# Patient Record
Sex: MALE | Race: WHITE | NOT HISPANIC OR LATINO | Employment: OTHER | URBAN - METROPOLITAN AREA
[De-identification: names, ages, dates, MRNs, and addresses within clinical notes are randomized per-mention and may not be internally consistent; named-entity substitution may affect disease eponyms.]

---

## 2022-10-30 ENCOUNTER — HOSPITAL ENCOUNTER (INPATIENT)
Facility: HOSPITAL | Age: 72
LOS: 4 days | Discharge: NURSING, REHAB OR LONG-TERM CARE | DRG: 208 | End: 2022-11-03
Attending: EMERGENCY MEDICINE | Admitting: STUDENT IN AN ORGANIZED HEALTH CARE EDUCATION/TRAINING PROGRAM
Payer: MEDICARE

## 2022-10-30 DIAGNOSIS — Z99.11 ON MECHANICALLY ASSISTED VENTILATION: ICD-10-CM

## 2022-10-30 DIAGNOSIS — U07.1 COVID: Primary | ICD-10-CM

## 2022-10-30 DIAGNOSIS — J96.01 ACUTE RESPIRATORY FAILURE WITH HYPOXIA: ICD-10-CM

## 2022-10-30 DIAGNOSIS — Z97.8 ENDOTRACHEALLY INTUBATED: ICD-10-CM

## 2022-10-30 DIAGNOSIS — J18.9 PNEUMONIA OF RIGHT MIDDLE LOBE DUE TO INFECTIOUS ORGANISM: ICD-10-CM

## 2022-10-30 DIAGNOSIS — J98.11 ATELECTASIS: ICD-10-CM

## 2022-10-30 DIAGNOSIS — R41.82 AMS (ALTERED MENTAL STATUS): ICD-10-CM

## 2022-10-30 DIAGNOSIS — G93.1 ACUTE ANOXIC ENCEPHALOPATHY: ICD-10-CM

## 2022-10-30 DIAGNOSIS — I82.443 ACUTE DEEP VEIN THROMBOSIS (DVT) OF TIBIAL VEIN OF BOTH LOWER EXTREMITIES: ICD-10-CM

## 2022-10-30 DIAGNOSIS — J96.01 ACUTE HYPOXEMIC RESPIRATORY FAILURE: ICD-10-CM

## 2022-10-30 PROBLEM — I10 HYPERTENSION: Status: ACTIVE | Noted: 2022-10-30

## 2022-10-30 PROBLEM — R76.8 COVID-19 VIRUS ANTIBODY DETECTED: Status: ACTIVE | Noted: 2022-10-30

## 2022-10-30 PROBLEM — E53.8 B12 DEFICIENCY: Status: ACTIVE | Noted: 2022-05-04

## 2022-10-30 PROBLEM — M79.605 BILATERAL LEG PAIN: Status: ACTIVE | Noted: 2022-07-28

## 2022-10-30 PROBLEM — G47.00 INSOMNIA: Status: ACTIVE | Noted: 2022-07-18

## 2022-10-30 PROBLEM — G06.1 ABSCESS IN EPIDURAL SPACE OF LUMBAR SPINE: Status: ACTIVE | Noted: 2022-05-04

## 2022-10-30 PROBLEM — M79.604 BILATERAL LEG PAIN: Status: ACTIVE | Noted: 2022-07-28

## 2022-10-30 PROBLEM — M54.50 ACUTE MIDLINE LOW BACK PAIN WITHOUT SCIATICA: Status: ACTIVE | Noted: 2022-05-02

## 2022-10-30 PROBLEM — K21.9 GASTROESOPHAGEAL REFLUX DISEASE WITHOUT ESOPHAGITIS: Status: ACTIVE | Noted: 2020-08-12

## 2022-10-30 LAB
ALBUMIN SERPL BCP-MCNC: 3.1 G/DL (ref 3.5–5.2)
ALLENS TEST: ABNORMAL
ALP SERPL-CCNC: 74 U/L (ref 55–135)
ALT SERPL W/O P-5'-P-CCNC: 14 U/L (ref 10–44)
AMMONIA PLAS-SCNC: NORMAL UMOL/L
ANION GAP SERPL CALC-SCNC: 10 MMOL/L (ref 8–16)
AORTIC ROOT ANNULUS: 4.61 CM
ASCENDING AORTA: 4.04 CM
AST SERPL-CCNC: 16 U/L (ref 10–40)
AV INDEX (PROSTH): 0.6
AV MEAN GRADIENT: 4 MMHG
AV PEAK GRADIENT: 6 MMHG
AV VALVE AREA: 2.73 CM2
AV VELOCITY RATIO: 0.6
BACTERIA #/AREA URNS HPF: ABNORMAL /HPF
BASOPHILS # BLD AUTO: 0.03 K/UL (ref 0–0.2)
BASOPHILS NFR BLD: 0.3 % (ref 0–1.9)
BILIRUB SERPL-MCNC: 0.3 MG/DL (ref 0.1–1)
BILIRUB UR QL STRIP: NEGATIVE
BNP SERPL-MCNC: 34 PG/ML (ref 0–99)
BUN SERPL-MCNC: 16 MG/DL (ref 8–23)
CALCIUM SERPL-MCNC: 9 MG/DL (ref 8.7–10.5)
CHLORIDE SERPL-SCNC: 99 MMOL/L (ref 95–110)
CLARITY UR: CLEAR
CO2 SERPL-SCNC: 35 MMOL/L (ref 23–29)
COLOR UR: YELLOW
CREAT SERPL-MCNC: 0.7 MG/DL (ref 0.5–1.4)
CRP SERPL-MCNC: 21.1 MG/L (ref 0–8.2)
CV ECHO LV RWT: 0.97 CM
DELSYS: ABNORMAL
DIFFERENTIAL METHOD: ABNORMAL
DOP CALC AO PEAK VEL: 1.18 M/S
DOP CALC AO VTI: 22.7 CM
DOP CALC LVOT AREA: 4.5 CM2
DOP CALC LVOT DIAMETER: 2.4 CM
DOP CALC LVOT PEAK VEL: 0.71 M/S
DOP CALC LVOT STROKE VOLUME: 61.95 CM3
DOP CALC RVOT PEAK VEL: 0.73 M/S
DOP CALC RVOT VTI: 11.3 CM
DOP CALCLVOT PEAK VEL VTI: 13.7 CM
E WAVE DECELERATION TIME: 273.04 MSEC
E/A RATIO: 1.15
E/E' RATIO: 6.71 M/S
ECHO LV POSTERIOR WALL: 1.82 CM (ref 0.6–1.1)
EJECTION FRACTION: 50 %
EOSINOPHIL # BLD AUTO: 0.1 K/UL (ref 0–0.5)
EOSINOPHIL NFR BLD: 1.2 % (ref 0–8)
ERYTHROCYTE [DISTWIDTH] IN BLOOD BY AUTOMATED COUNT: 15.6 % (ref 11.5–14.5)
ERYTHROCYTE [SEDIMENTATION RATE] IN BLOOD BY WESTERGREN METHOD: 20 MM/H
ERYTHROCYTE [SEDIMENTATION RATE] IN BLOOD BY WESTERGREN METHOD: 27 MM/HR (ref 0–10)
ERYTHROCYTE [SEDIMENTATION RATE] IN BLOOD BY WESTERGREN METHOD: 30 MM/H
ERYTHROCYTE [SEDIMENTATION RATE] IN BLOOD BY WESTERGREN METHOD: 30 MM/H
EST. GFR  (NO RACE VARIABLE): >60 ML/MIN/1.73 M^2
FERRITIN SERPL-MCNC: 52 NG/ML (ref 20–300)
FIO2: 100
FRACTIONAL SHORTENING: 29 % (ref 28–44)
GLUCOSE SERPL-MCNC: 147 MG/DL (ref 70–110)
GLUCOSE UR QL STRIP: NEGATIVE
HCO3 UR-SCNC: 32.6 MMOL/L (ref 24–28)
HCO3 UR-SCNC: 35.4 MMOL/L (ref 24–28)
HCO3 UR-SCNC: 43.9 MMOL/L (ref 24–28)
HCT VFR BLD AUTO: 46.7 % (ref 40–54)
HGB BLD-MCNC: 13.9 G/DL (ref 14–18)
HGB UR QL STRIP: NEGATIVE
HYALINE CASTS #/AREA URNS LPF: 3 /LPF
IMM GRANULOCYTES # BLD AUTO: 0.08 K/UL (ref 0–0.04)
IMM GRANULOCYTES NFR BLD AUTO: 0.8 % (ref 0–0.5)
INFLUENZA A, MOLECULAR: NEGATIVE
INFLUENZA B, MOLECULAR: NEGATIVE
INTERVENTRICULAR SEPTUM: 2.1 CM (ref 0.6–1.1)
IVRT: 91.34 MSEC
KETONES UR QL STRIP: NEGATIVE
LA MAJOR: 4.3 CM
LA MINOR: 4.08 CM
LA WIDTH: 3.5 CM
LACTATE SERPL-SCNC: 0.7 MMOL/L (ref 0.5–2.2)
LEFT ATRIUM SIZE: 3.59 CM
LEFT ATRIUM VOLUME: 44.72 CM3
LEFT INTERNAL DIMENSION IN SYSTOLE: 2.68 CM (ref 2.1–4)
LEFT VENTRICLE DIASTOLIC VOLUME: 60.6 ML
LEFT VENTRICLE SYSTOLIC VOLUME: 26.5 ML
LEFT VENTRICULAR INTERNAL DIMENSION IN DIASTOLE: 3.77 CM (ref 3.5–6)
LEFT VENTRICULAR MASS: 334.38 G
LEUKOCYTE ESTERASE UR QL STRIP: NEGATIVE
LV LATERAL E/E' RATIO: 5.88 M/S
LV SEPTAL E/E' RATIO: 7.83 M/S
LVOT MG: 1.59 MMHG
LVOT MV: 0.61 CM/S
LYMPHOCYTES # BLD AUTO: 0.7 K/UL (ref 1–4.8)
LYMPHOCYTES NFR BLD: 7.6 % (ref 18–48)
MCH RBC QN AUTO: 30.7 PG (ref 27–31)
MCHC RBC AUTO-ENTMCNC: 29.8 G/DL (ref 32–36)
MCV RBC AUTO: 103 FL (ref 82–98)
MICROSCOPIC COMMENT: ABNORMAL
MODE: ABNORMAL
MONOCYTES # BLD AUTO: 0.8 K/UL (ref 0.3–1)
MONOCYTES NFR BLD: 8.6 % (ref 4–15)
MV PEAK A VEL: 0.41 M/S
MV PEAK E VEL: 0.47 M/S
MV STENOSIS PRESSURE HALF TIME: 79.18 MS
MV VALVE AREA P 1/2 METHOD: 2.78 CM2
NEUTROPHILS # BLD AUTO: 7.8 K/UL (ref 1.8–7.7)
NEUTROPHILS NFR BLD: 81.5 % (ref 38–73)
NITRITE UR QL STRIP: NEGATIVE
NRBC BLD-RTO: 0 /100 WBC
PCO2 BLDA: 44.3 MMHG (ref 35–45)
PCO2 BLDA: 56.1 MMHG (ref 35–45)
PCO2 BLDA: 98.5 MMHG (ref 35–45)
PEEP: 10
PEEP: 10
PEEP: 8
PH SMN: 7.26 [PH] (ref 7.35–7.45)
PH SMN: 7.41 [PH] (ref 7.35–7.45)
PH SMN: 7.47 [PH] (ref 7.35–7.45)
PH UR STRIP: 6 [PH] (ref 5–8)
PISA TR MAX VEL: 2.14 M/S
PLATELET # BLD AUTO: 206 K/UL (ref 150–450)
PMV BLD AUTO: 9.3 FL (ref 9.2–12.9)
PO2 BLDA: 54 MMHG (ref 80–100)
PO2 BLDA: 67 MMHG (ref 80–100)
PO2 BLDA: 71 MMHG (ref 80–100)
POC BE: 11 MMOL/L
POC BE: 17 MMOL/L
POC BE: 9 MMOL/L
POC SATURATED O2: 89 % (ref 95–100)
POC SATURATED O2: 90 % (ref 95–100)
POC SATURATED O2: 93 % (ref 95–100)
POCT GLUCOSE: 102 MG/DL (ref 70–110)
POCT GLUCOSE: 133 MG/DL (ref 70–110)
POTASSIUM SERPL-SCNC: 4.7 MMOL/L (ref 3.5–5.1)
PROCALCITONIN SERPL IA-MCNC: 0.02 NG/ML
PROCALCITONIN SERPL IA-MCNC: 0.03 NG/ML
PROT SERPL-MCNC: 7.6 G/DL (ref 6–8.4)
PROT UR QL STRIP: ABNORMAL
PV MEAN GRADIENT: 0.99 MMHG
RA MAJOR: 4.43 CM
RA WIDTH: 2.9 CM
RBC # BLD AUTO: 4.53 M/UL (ref 4.6–6.2)
RBC #/AREA URNS HPF: 0 /HPF (ref 0–4)
RIGHT VENTRICULAR END-DIASTOLIC DIMENSION: 4.45 CM
SAMPLE: ABNORMAL
SARS-COV-2 RDRP RESP QL NAA+PROBE: POSITIVE
SINUS: 4.75 CM
SITE: ABNORMAL
SODIUM SERPL-SCNC: 144 MMOL/L (ref 136–145)
SP GR UR STRIP: 1.02 (ref 1–1.03)
SPECIMEN SOURCE: NORMAL
STJ: 4.2 CM
T4 FREE SERPL-MCNC: 0.89 NG/DL (ref 0.71–1.51)
TDI LATERAL: 0.08 M/S
TDI SEPTAL: 0.06 M/S
TDI: 0.07 M/S
TR MAX PG: 18 MMHG
TROPONIN I SERPL DL<=0.01 NG/ML-MCNC: <0.006 NG/ML (ref 0–0.03)
TSH SERPL DL<=0.005 MIU/L-ACNC: 0.23 UIU/ML (ref 0.4–4)
URN SPEC COLLECT METH UR: ABNORMAL
UROBILINOGEN UR STRIP-ACNC: ABNORMAL EU/DL
VT: 450
VT: 470
VT: 470
WBC # BLD AUTO: 9.53 K/UL (ref 3.9–12.7)
WBC #/AREA URNS HPF: 1 /HPF (ref 0–5)

## 2022-10-30 PROCEDURE — 87040 BLOOD CULTURE FOR BACTERIA: CPT | Mod: 59 | Performed by: EMERGENCY MEDICINE

## 2022-10-30 PROCEDURE — 87502 INFLUENZA DNA AMP PROBE: CPT

## 2022-10-30 PROCEDURE — 36600 WITHDRAWAL OF ARTERIAL BLOOD: CPT

## 2022-10-30 PROCEDURE — 82803 BLOOD GASES ANY COMBINATION: CPT

## 2022-10-30 PROCEDURE — 25000242 PHARM REV CODE 250 ALT 637 W/ HCPCS: Performed by: STUDENT IN AN ORGANIZED HEALTH CARE EDUCATION/TRAINING PROGRAM

## 2022-10-30 PROCEDURE — 99900026 HC AIRWAY MAINTENANCE (STAT)

## 2022-10-30 PROCEDURE — 99291 PR CRITICAL CARE, E/M 30-74 MINUTES: ICD-10-PCS | Mod: ,,, | Performed by: INTERNAL MEDICINE

## 2022-10-30 PROCEDURE — 63600175 PHARM REV CODE 636 W HCPCS

## 2022-10-30 PROCEDURE — 63600175 PHARM REV CODE 636 W HCPCS: Performed by: STUDENT IN AN ORGANIZED HEALTH CARE EDUCATION/TRAINING PROGRAM

## 2022-10-30 PROCEDURE — 99900035 HC TECH TIME PER 15 MIN (STAT)

## 2022-10-30 PROCEDURE — 80053 COMPREHEN METABOLIC PANEL: CPT | Performed by: EMERGENCY MEDICINE

## 2022-10-30 PROCEDURE — 86140 C-REACTIVE PROTEIN: CPT | Performed by: INTERNAL MEDICINE

## 2022-10-30 PROCEDURE — 27200966 HC CLOSED SUCTION SYSTEM

## 2022-10-30 PROCEDURE — 25000003 PHARM REV CODE 250: Performed by: STUDENT IN AN ORGANIZED HEALTH CARE EDUCATION/TRAINING PROGRAM

## 2022-10-30 PROCEDURE — 84145 PROCALCITONIN (PCT): CPT | Mod: 91 | Performed by: INTERNAL MEDICINE

## 2022-10-30 PROCEDURE — 94761 N-INVAS EAR/PLS OXIMETRY MLT: CPT

## 2022-10-30 PROCEDURE — 84443 ASSAY THYROID STIM HORMONE: CPT | Performed by: INTERNAL MEDICINE

## 2022-10-30 PROCEDURE — 87798 DETECT AGENT NOS DNA AMP: CPT | Mod: 59 | Performed by: INTERNAL MEDICINE

## 2022-10-30 PROCEDURE — 87205 SMEAR GRAM STAIN: CPT | Performed by: INTERNAL MEDICINE

## 2022-10-30 PROCEDURE — 93010 EKG 12-LEAD: ICD-10-PCS | Mod: ,,, | Performed by: INTERNAL MEDICINE

## 2022-10-30 PROCEDURE — 36556 INSERT NON-TUNNEL CV CATH: CPT

## 2022-10-30 PROCEDURE — 36415 COLL VENOUS BLD VENIPUNCTURE: CPT | Performed by: INTERNAL MEDICINE

## 2022-10-30 PROCEDURE — 99291 CRITICAL CARE FIRST HOUR: CPT | Mod: ,,, | Performed by: INTERNAL MEDICINE

## 2022-10-30 PROCEDURE — 25500020 PHARM REV CODE 255: Performed by: STUDENT IN AN ORGANIZED HEALTH CARE EDUCATION/TRAINING PROGRAM

## 2022-10-30 PROCEDURE — 25000003 PHARM REV CODE 250: Performed by: EMERGENCY MEDICINE

## 2022-10-30 PROCEDURE — 85025 COMPLETE CBC W/AUTO DIFF WBC: CPT | Performed by: EMERGENCY MEDICINE

## 2022-10-30 PROCEDURE — 63600175 PHARM REV CODE 636 W HCPCS: Performed by: INTERNAL MEDICINE

## 2022-10-30 PROCEDURE — 82728 ASSAY OF FERRITIN: CPT | Performed by: INTERNAL MEDICINE

## 2022-10-30 PROCEDURE — 94667 MNPJ CHEST WALL 1ST: CPT

## 2022-10-30 PROCEDURE — 27000221 HC OXYGEN, UP TO 24 HOURS

## 2022-10-30 PROCEDURE — 25000003 PHARM REV CODE 250: Performed by: INTERNAL MEDICINE

## 2022-10-30 PROCEDURE — 82140 ASSAY OF AMMONIA: CPT | Performed by: INTERNAL MEDICINE

## 2022-10-30 PROCEDURE — 27000207 HC ISOLATION

## 2022-10-30 PROCEDURE — 94668 MNPJ CHEST WALL SBSQ: CPT

## 2022-10-30 PROCEDURE — 84145 PROCALCITONIN (PCT): CPT | Performed by: EMERGENCY MEDICINE

## 2022-10-30 PROCEDURE — 36620 INSERTION CATHETER ARTERY: CPT

## 2022-10-30 PROCEDURE — 31500 INSERT EMERGENCY AIRWAY: CPT

## 2022-10-30 PROCEDURE — 81000 URINALYSIS NONAUTO W/SCOPE: CPT | Performed by: EMERGENCY MEDICINE

## 2022-10-30 PROCEDURE — 63600175 PHARM REV CODE 636 W HCPCS: Mod: TB | Performed by: INTERNAL MEDICINE

## 2022-10-30 PROCEDURE — 63600175 PHARM REV CODE 636 W HCPCS: Performed by: EMERGENCY MEDICINE

## 2022-10-30 PROCEDURE — 25000242 PHARM REV CODE 250 ALT 637 W/ HCPCS: Performed by: INTERNAL MEDICINE

## 2022-10-30 PROCEDURE — 83880 ASSAY OF NATRIURETIC PEPTIDE: CPT | Performed by: EMERGENCY MEDICINE

## 2022-10-30 PROCEDURE — 20000000 HC ICU ROOM

## 2022-10-30 PROCEDURE — 84484 ASSAY OF TROPONIN QUANT: CPT | Performed by: EMERGENCY MEDICINE

## 2022-10-30 PROCEDURE — U0002 COVID-19 LAB TEST NON-CDC: HCPCS | Performed by: EMERGENCY MEDICINE

## 2022-10-30 PROCEDURE — 87502 INFLUENZA DNA AMP PROBE: CPT | Performed by: EMERGENCY MEDICINE

## 2022-10-30 PROCEDURE — 93005 ELECTROCARDIOGRAM TRACING: CPT

## 2022-10-30 PROCEDURE — 85651 RBC SED RATE NONAUTOMATED: CPT | Performed by: INTERNAL MEDICINE

## 2022-10-30 PROCEDURE — 93010 ELECTROCARDIOGRAM REPORT: CPT | Mod: ,,, | Performed by: INTERNAL MEDICINE

## 2022-10-30 PROCEDURE — 83605 ASSAY OF LACTIC ACID: CPT | Performed by: EMERGENCY MEDICINE

## 2022-10-30 PROCEDURE — 94002 VENT MGMT INPAT INIT DAY: CPT

## 2022-10-30 PROCEDURE — 82533 TOTAL CORTISOL: CPT | Performed by: INTERNAL MEDICINE

## 2022-10-30 PROCEDURE — 99291 CRITICAL CARE FIRST HOUR: CPT | Mod: 25

## 2022-10-30 PROCEDURE — 94640 AIRWAY INHALATION TREATMENT: CPT

## 2022-10-30 PROCEDURE — 84439 ASSAY OF FREE THYROXINE: CPT | Performed by: INTERNAL MEDICINE

## 2022-10-30 PROCEDURE — 87070 CULTURE OTHR SPECIMN AEROBIC: CPT | Performed by: INTERNAL MEDICINE

## 2022-10-30 RX ORDER — GABAPENTIN 400 MG/1
400 CAPSULE ORAL 3 TIMES DAILY
COMMUNITY

## 2022-10-30 RX ORDER — DOCUSATE SODIUM 50 MG/5ML
100 LIQUID ORAL DAILY
Status: DISCONTINUED | OUTPATIENT
Start: 2022-10-30 | End: 2022-10-31

## 2022-10-30 RX ORDER — ASPIRIN 81 MG/1
81 TABLET ORAL DAILY
COMMUNITY

## 2022-10-30 RX ORDER — FENTANYL CITRATE-0.9 % NACL/PF 10 MCG/ML
0-200 PLASTIC BAG, INJECTION (ML) INTRAVENOUS CONTINUOUS
Status: DISCONTINUED | OUTPATIENT
Start: 2022-10-30 | End: 2022-11-02

## 2022-10-30 RX ORDER — ROCURONIUM BROMIDE 10 MG/ML
100 INJECTION, SOLUTION INTRAVENOUS ONCE
Status: COMPLETED | OUTPATIENT
Start: 2022-10-30 | End: 2022-10-30

## 2022-10-30 RX ORDER — CHLORHEXIDINE GLUCONATE ORAL RINSE 1.2 MG/ML
15 SOLUTION DENTAL 2 TIMES DAILY
Status: DISCONTINUED | OUTPATIENT
Start: 2022-10-30 | End: 2022-11-02

## 2022-10-30 RX ORDER — AMOXICILLIN 250 MG
1 CAPSULE ORAL NIGHTLY
Status: ON HOLD | COMMUNITY
Start: 2022-05-23 | End: 2022-11-03 | Stop reason: HOSPADM

## 2022-10-30 RX ORDER — METOPROLOL SUCCINATE 50 MG/1
50 TABLET, EXTENDED RELEASE ORAL DAILY
COMMUNITY

## 2022-10-30 RX ORDER — ETOMIDATE 2 MG/ML
20 INJECTION INTRAVENOUS
Status: COMPLETED | OUTPATIENT
Start: 2022-10-30 | End: 2022-10-30

## 2022-10-30 RX ORDER — ETOMIDATE 2 MG/ML
INJECTION INTRAVENOUS
Status: DISPENSED
Start: 2022-10-30 | End: 2022-10-30

## 2022-10-30 RX ORDER — MORPHINE SULFATE 4 MG/ML
4 INJECTION, SOLUTION INTRAMUSCULAR; INTRAVENOUS EVERY 4 HOURS PRN
Status: DISCONTINUED | OUTPATIENT
Start: 2022-10-30 | End: 2022-11-03 | Stop reason: HOSPADM

## 2022-10-30 RX ORDER — ALBUTEROL SULFATE 0.83 MG/ML
2.5 SOLUTION RESPIRATORY (INHALATION) EVERY 8 HOURS
Status: DISCONTINUED | OUTPATIENT
Start: 2022-10-30 | End: 2022-11-01

## 2022-10-30 RX ORDER — ALBUTEROL SULFATE 0.83 MG/ML
2.5 SOLUTION RESPIRATORY (INHALATION) EVERY 4 HOURS PRN
Status: DISCONTINUED | OUTPATIENT
Start: 2022-10-30 | End: 2022-10-30

## 2022-10-30 RX ORDER — ACETAMINOPHEN, DIPHENHYDRAMINE HCL, PHENYLEPHRINE HCL 325; 25; 5 MG/1; MG/1; MG/1
10 TABLET ORAL NIGHTLY
COMMUNITY

## 2022-10-30 RX ORDER — ACETAMINOPHEN 325 MG/1
650 TABLET ORAL EVERY 4 HOURS PRN
Status: DISCONTINUED | OUTPATIENT
Start: 2022-10-30 | End: 2022-10-31 | Stop reason: SDUPTHER

## 2022-10-30 RX ORDER — FOLIC ACID 1 MG/1
1 TABLET ORAL DAILY
COMMUNITY
Start: 2022-05-24 | End: 2023-05-24

## 2022-10-30 RX ORDER — PROPOFOL 10 MG/ML
0-50 INJECTION, EMULSION INTRAVENOUS CONTINUOUS
Status: DISCONTINUED | OUTPATIENT
Start: 2022-10-30 | End: 2022-11-02

## 2022-10-30 RX ORDER — PROPOFOL 10 MG/ML
0-50 INJECTION, EMULSION INTRAVENOUS CONTINUOUS
Status: DISCONTINUED | OUTPATIENT
Start: 2022-10-30 | End: 2022-10-30

## 2022-10-30 RX ORDER — NOREPINEPHRINE BITARTRATE/D5W 4MG/250ML
0-3 PLASTIC BAG, INJECTION (ML) INTRAVENOUS CONTINUOUS
Status: DISCONTINUED | OUTPATIENT
Start: 2022-10-30 | End: 2022-11-02

## 2022-10-30 RX ORDER — MUPIROCIN 20 MG/G
OINTMENT TOPICAL 2 TIMES DAILY
Status: DISCONTINUED | OUTPATIENT
Start: 2022-10-30 | End: 2022-11-03 | Stop reason: HOSPADM

## 2022-10-30 RX ORDER — FAMOTIDINE 10 MG/ML
20 INJECTION INTRAVENOUS EVERY 12 HOURS
Status: DISCONTINUED | OUTPATIENT
Start: 2022-10-30 | End: 2022-10-31

## 2022-10-30 RX ORDER — ZINC SULFATE 50(220)MG
220 CAPSULE ORAL DAILY
Status: DISCONTINUED | OUTPATIENT
Start: 2022-10-30 | End: 2022-10-31

## 2022-10-30 RX ORDER — MAGNESIUM SULFATE HEPTAHYDRATE 40 MG/ML
4 INJECTION, SOLUTION INTRAVENOUS
Status: DISCONTINUED | OUTPATIENT
Start: 2022-10-30 | End: 2022-11-03 | Stop reason: HOSPADM

## 2022-10-30 RX ORDER — LOSARTAN POTASSIUM 50 MG/1
1 TABLET ORAL DAILY
COMMUNITY
Start: 2022-05-24 | End: 2023-05-24

## 2022-10-30 RX ORDER — ONDANSETRON 4 MG/1
4 TABLET, FILM COATED ORAL EVERY 4 HOURS PRN
COMMUNITY

## 2022-10-30 RX ORDER — PROPOFOL 10 MG/ML
INJECTION, EMULSION INTRAVENOUS
Status: COMPLETED
Start: 2022-10-30 | End: 2022-10-30

## 2022-10-30 RX ORDER — SODIUM CHLORIDE 0.9 % (FLUSH) 0.9 %
10 SYRINGE (ML) INJECTION
Status: DISCONTINUED | OUTPATIENT
Start: 2022-10-30 | End: 2022-11-03 | Stop reason: HOSPADM

## 2022-10-30 RX ORDER — INSULIN ASPART 100 [IU]/ML
1-10 INJECTION, SOLUTION INTRAVENOUS; SUBCUTANEOUS EVERY 6 HOURS PRN
Status: DISCONTINUED | OUTPATIENT
Start: 2022-10-30 | End: 2022-11-03 | Stop reason: HOSPADM

## 2022-10-30 RX ORDER — ATORVASTATIN CALCIUM 10 MG/1
1 TABLET, FILM COATED ORAL DAILY
COMMUNITY
Start: 2022-01-13

## 2022-10-30 RX ORDER — DEXAMETHASONE SODIUM PHOSPHATE 4 MG/ML
6 INJECTION, SOLUTION INTRA-ARTICULAR; INTRALESIONAL; INTRAMUSCULAR; INTRAVENOUS; SOFT TISSUE
Status: COMPLETED | OUTPATIENT
Start: 2022-10-30 | End: 2022-10-30

## 2022-10-30 RX ORDER — AMLODIPINE BESYLATE 5 MG/1
1 TABLET ORAL DAILY
COMMUNITY
Start: 2022-05-24 | End: 2023-05-24

## 2022-10-30 RX ORDER — POTASSIUM CHLORIDE 29.8 MG/ML
40 INJECTION INTRAVENOUS
Status: DISCONTINUED | OUTPATIENT
Start: 2022-10-30 | End: 2022-11-03 | Stop reason: HOSPADM

## 2022-10-30 RX ORDER — SODIUM CHLORIDE FOR INHALATION 3 %
4 VIAL, NEBULIZER (ML) INHALATION EVERY 8 HOURS
Status: DISCONTINUED | OUTPATIENT
Start: 2022-10-30 | End: 2022-11-03 | Stop reason: HOSPADM

## 2022-10-30 RX ORDER — SODIUM CHLORIDE 9 MG/ML
INJECTION, SOLUTION INTRAVENOUS CONTINUOUS
Status: DISCONTINUED | OUTPATIENT
Start: 2022-10-30 | End: 2022-11-02

## 2022-10-30 RX ORDER — GLUCAGON 1 MG
1 KIT INJECTION
Status: DISCONTINUED | OUTPATIENT
Start: 2022-10-30 | End: 2022-11-03 | Stop reason: HOSPADM

## 2022-10-30 RX ORDER — ASCORBIC ACID 500 MG
1000 TABLET ORAL DAILY
Status: DISCONTINUED | OUTPATIENT
Start: 2022-10-30 | End: 2022-10-31

## 2022-10-30 RX ORDER — POLYETHYLENE GLYCOL 3350 17 G/17G
17 POWDER, FOR SOLUTION ORAL DAILY
Status: DISCONTINUED | OUTPATIENT
Start: 2022-10-30 | End: 2022-10-31

## 2022-10-30 RX ORDER — TEMAZEPAM 15 MG/1
15 CAPSULE ORAL NIGHTLY PRN
COMMUNITY
Start: 2022-10-18 | End: 2022-11-17

## 2022-10-30 RX ORDER — ACETYLCYSTEINE 100 MG/ML
4 SOLUTION ORAL; RESPIRATORY (INHALATION) 3 TIMES DAILY
Status: DISCONTINUED | OUTPATIENT
Start: 2022-10-30 | End: 2022-11-01

## 2022-10-30 RX ORDER — MAGNESIUM SULFATE HEPTAHYDRATE 40 MG/ML
2 INJECTION, SOLUTION INTRAVENOUS
Status: DISCONTINUED | OUTPATIENT
Start: 2022-10-30 | End: 2022-11-03 | Stop reason: HOSPADM

## 2022-10-30 RX ORDER — BUPROPION HYDROCHLORIDE 300 MG/1
1 TABLET ORAL DAILY
COMMUNITY
Start: 2021-11-09

## 2022-10-30 RX ORDER — CEFEPIME HYDROCHLORIDE 1 G/50ML
1 INJECTION, SOLUTION INTRAVENOUS
Status: DISCONTINUED | OUTPATIENT
Start: 2022-10-30 | End: 2022-10-31

## 2022-10-30 RX ORDER — POTASSIUM CHLORIDE 14.9 MG/ML
20 INJECTION INTRAVENOUS
Status: DISCONTINUED | OUTPATIENT
Start: 2022-10-30 | End: 2022-11-03 | Stop reason: HOSPADM

## 2022-10-30 RX ORDER — CALCIUM GLUCONATE 20 MG/ML
3 INJECTION, SOLUTION INTRAVENOUS
Status: DISCONTINUED | OUTPATIENT
Start: 2022-10-30 | End: 2022-11-03 | Stop reason: HOSPADM

## 2022-10-30 RX ORDER — ONDANSETRON 2 MG/ML
4 INJECTION INTRAMUSCULAR; INTRAVENOUS EVERY 8 HOURS PRN
Status: DISCONTINUED | OUTPATIENT
Start: 2022-10-30 | End: 2022-11-03 | Stop reason: HOSPADM

## 2022-10-30 RX ORDER — CEFEPIME HYDROCHLORIDE 1 G/50ML
2 INJECTION, SOLUTION INTRAVENOUS
Status: COMPLETED | OUTPATIENT
Start: 2022-10-30 | End: 2022-10-30

## 2022-10-30 RX ORDER — ROCURONIUM BROMIDE 10 MG/ML
INJECTION, SOLUTION INTRAVENOUS
Status: DISPENSED
Start: 2022-10-30 | End: 2022-10-30

## 2022-10-30 RX ORDER — METRONIDAZOLE 500 MG/1
500 TABLET ORAL EVERY 8 HOURS
Status: DISCONTINUED | OUTPATIENT
Start: 2022-10-30 | End: 2022-10-31

## 2022-10-30 RX ORDER — ALBUTEROL SULFATE 90 UG/1
2 AEROSOL, METERED RESPIRATORY (INHALATION) EVERY 8 HOURS
Status: DISCONTINUED | OUTPATIENT
Start: 2022-10-30 | End: 2022-10-30

## 2022-10-30 RX ORDER — CALCIUM GLUCONATE 20 MG/ML
2 INJECTION, SOLUTION INTRAVENOUS
Status: DISCONTINUED | OUTPATIENT
Start: 2022-10-30 | End: 2022-11-03 | Stop reason: HOSPADM

## 2022-10-30 RX ORDER — CALCIUM GLUCONATE 20 MG/ML
1 INJECTION, SOLUTION INTRAVENOUS
Status: DISCONTINUED | OUTPATIENT
Start: 2022-10-30 | End: 2022-11-03 | Stop reason: HOSPADM

## 2022-10-30 RX ADMIN — MUPIROCIN: 20 OINTMENT TOPICAL at 09:10

## 2022-10-30 RX ADMIN — CEFEPIME HYDROCHLORIDE 1 G: 1 INJECTION, SOLUTION INTRAVENOUS at 07:10

## 2022-10-30 RX ADMIN — ROCURONIUM BROMIDE 100 MG: 50 INJECTION INTRAVENOUS at 08:10

## 2022-10-30 RX ADMIN — SODIUM CHLORIDE: 9 INJECTION, SOLUTION INTRAVENOUS at 12:10

## 2022-10-30 RX ADMIN — SODIUM CHLORIDE 30 MG/ML INHALATION SOLUTION 4 ML: 30 SOLUTION INHALANT at 11:10

## 2022-10-30 RX ADMIN — CHLORHEXIDINE GLUCONATE 0.12% ORAL RINSE 15 ML: 1.2 LIQUID ORAL at 09:10

## 2022-10-30 RX ADMIN — ALBUTEROL SULFATE 2.5 MG: 2.5 SOLUTION RESPIRATORY (INHALATION) at 11:10

## 2022-10-30 RX ADMIN — PROPOFOL 35 MCG/KG/MIN: 10 INJECTION, EMULSION INTRAVENOUS at 09:10

## 2022-10-30 RX ADMIN — Medication 25 MCG/HR: at 12:10

## 2022-10-30 RX ADMIN — ACETYLCYSTEINE 4 ML: 100 INHALANT RESPIRATORY (INHALATION) at 11:10

## 2022-10-30 RX ADMIN — DEXAMETHASONE SODIUM PHOSPHATE 6 MG: 4 INJECTION INTRA-ARTICULAR; INTRALESIONAL; INTRAMUSCULAR; INTRAVENOUS; SOFT TISSUE at 11:10

## 2022-10-30 RX ADMIN — CEFEPIME HYDROCHLORIDE 2 G: 2 INJECTION, SOLUTION INTRAVENOUS at 12:10

## 2022-10-30 RX ADMIN — REMDESIVIR 200 MG: 100 INJECTION, POWDER, LYOPHILIZED, FOR SOLUTION INTRAVENOUS at 05:10

## 2022-10-30 RX ADMIN — ALBUTEROL SULFATE 2.5 MG: 2.5 SOLUTION RESPIRATORY (INHALATION) at 03:10

## 2022-10-30 RX ADMIN — SODIUM CHLORIDE 30 MG/ML INHALATION SOLUTION 4 ML: 30 SOLUTION INHALANT at 03:10

## 2022-10-30 RX ADMIN — Medication 0.02 MCG/KG/MIN: at 06:10

## 2022-10-30 RX ADMIN — PROPOFOL 25 MCG/KG/MIN: 10 INJECTION, EMULSION INTRAVENOUS at 04:10

## 2022-10-30 RX ADMIN — VANCOMYCIN HYDROCHLORIDE 2500 MG: 10 INJECTION, POWDER, LYOPHILIZED, FOR SOLUTION INTRAVENOUS at 10:10

## 2022-10-30 RX ADMIN — ETOMIDATE 20 MG: 2 INJECTION INTRAVENOUS at 08:10

## 2022-10-30 RX ADMIN — METRONIDAZOLE 500 MG: 500 TABLET ORAL at 09:10

## 2022-10-30 RX ADMIN — FAMOTIDINE 20 MG: 10 INJECTION INTRAVENOUS at 09:10

## 2022-10-30 RX ADMIN — ACETYLCYSTEINE 4 ML: 100 INHALANT RESPIRATORY (INHALATION) at 03:10

## 2022-10-30 RX ADMIN — PROPOFOL 5 MCG/KG/MIN: 10 INJECTION, EMULSION INTRAVENOUS at 09:10

## 2022-10-30 RX ADMIN — IOHEXOL 100 ML: 350 INJECTION, SOLUTION INTRAVENOUS at 01:10

## 2022-10-30 NOTE — NURSING
Pt arrived to ICU bed 10. Placed in ICU bed from ER stretched. Vitals obtained. Pt is intubated/sedated on vent 100% FiO2, 10 Peep. NS @ 100, Prop and Fent gtt infusing for RASS -2. BSWR in place. Rojas in place. OG tube clamped. No skin breakdown noted.

## 2022-10-30 NOTE — H&P
[facility] - Emergency Dept.  [facility] Medicine  History & Physical    Patient Name: Brady Haines  MRN: [id number]  Patient Class: IP- Inpatient  Admission Date: 10/30/2022  Attending Physician: Guerline Arevalo, *   Primary Care Provider: Mickie Hui MD         Patient information was obtained from ER records.     Subjective:     Principal Problem:<principal problem not specified>    Chief Complaint:   Chief Complaint   Patient presents with    unresponsive     Pt found unresponsive at nursing home        HPI: Patient was intubated and sedated at the time of examination, got most of information from documentation    Brady Haines is a 72 y.o. male patient with a PMHx of Hypertension and Sepsis due to methicillin susceptible Staphylococcus aureus (MSSA) with acute hypercapnic respiratory failure without septic shock (HCC) (5/2/2022) who presents to the Emergency Department for evaluation of unresponsiveness. Pt was found unresponsive at a nursing home this morning. Per EMS, pt was found with agonal breathing and O2 sat of 40%. Pt was found with blood glucose of 100. EMS tried to intubate en route but was unsuccessful. EMS was also unsuccessful at starting an IV. Per EMS, pt responds to painful stimuli. Symptoms are constant and moderate in severity. HPI and ROS limited due to pt being unresponsive.    Patient is a [facility] and requires 24/7 care and support for all activities. As per wife- patient has a history of prolonged  hospitalization at [facility] in May 2022: OL due Spine Abscess: (Staph and acinetobacter)  Unable to participate in physical therapy due to knee joint issues ;           Past Medical History:   Diagnosis Date    Essential (primary) hypertension     Mixed hyperlipidemia        History reviewed. No pertinent surgical history.    Review of patient's allergies indicates:  Not on File    No current facility-administered medications on file prior to  encounter.     Current Outpatient Medications on File Prior to Encounter   Medication Sig    amLODIPine (NORVASC) 5 MG tablet Take 1 tablet by mouth once daily.    apixaban (ELIQUIS) 5 mg Tab Take 5 mg by mouth 2 (two) times daily.    aspirin (ECOTRIN) 81 MG EC tablet Take 81 mg by mouth once daily.    atorvastatin (LIPITOR) 10 MG tablet Take 1 tablet by mouth once daily.    buPROPion (WELLBUTRIN XL) 300 MG 24 hr tablet Take 1 tablet by mouth once daily.    folic acid (FOLVITE) 1 MG tablet Take 1 tablet by mouth once daily.    gabapentin (NEURONTIN) 400 MG capsule Take 400 mg by mouth 3 (three) times daily.    losartan (COZAAR) 50 MG tablet Take 1 tablet by mouth once daily.    melatonin 10 mg Tab Take 10 mg by mouth every evening.    metoprolol succinate (TOPROL-XL) 50 MG 24 hr tablet Take 50 mg by mouth once daily.    ondansetron (ZOFRAN) 4 MG tablet Take 4 mg by mouth every 4 (four) hours as needed for Nausea.    senna-docusate 8.6-50 mg (PERICOLACE) 8.6-50 mg per tablet Take 1 tablet by mouth every evening.    temazepam (RESTORIL) 15 mg Cap Take 15 mg by mouth nightly as needed for Insomnia.     Family History    None       Tobacco Use    Smoking status: Not on file    Smokeless tobacco: Not on file   Substance and Sexual Activity    Alcohol use: Not on file    Drug use: Not on file    Sexual activity: Not on file     Review of Systems    Unable to obtain review of system, patient intubated and sedated at the time of examination.    Objective:     Vital Signs (Most Recent):  Temp: 99.7 °F (37.6 °C) (10/30/22 1515)  Pulse: 79 (10/30/22 1512)  Resp: (!) 30 (10/30/22 1512)  BP: 115/77 (10/30/22 1512)  SpO2: 98 % (10/30/22 1512)   Vital Signs (24h Range):  Temp:  [95 °F (35 °C)-99.7 °F (37.6 °C)] 99.7 °F (37.6 °C)  Pulse:  [71-81] 79  Resp:  [20-31] 30  SpO2:  [92 %-99 %] 98 %  BP: ()/(63-98) 115/77     Weight: 127 kg (280 lb)  There is no height or weight on file to calculate  BMI.    Physical Exam        Constitutional: Patient is in severe distress. Well-developed and well-nourished.  Head: Atraumatic. Normocephalic.  Eyes: PERRL. EOM intact. Conjunctivae are not pale. No scleral icterus.  ENT: dentures  Neck: Supple. Full ROM. No lymphadenopathy.  Cardiovascular: Regular rate. Regular rhythm. No murmurs, rubs, or gallops. Distal pulses are 2+ and symmetric.  Pulmonary/Chest: BVM in process  Abdominal: Soft and non-distended.  There is no tenderness.  No rebound, guarding, or rigidity.  Musculoskeletal: No edema.   Skin: Warm and dry.  Neurological:  intubated and sedated;     Significant Labs:     Results for orders placed or performed during the hospital encounter of 10/30/22   Influenza A & B by Molecular    Specimen: Nasopharyngeal Swab   Result Value Ref Range    Influenza A, Molecular Negative Negative    Influenza B, Molecular Negative Negative    Flu A & B Source Nasal swab    CBC auto differential   Result Value Ref Range    WBC 9.53 3.90 - 12.70 K/uL    RBC 4.53 (L) 4.60 - 6.20 M/uL    Hemoglobin 13.9 (L) 14.0 - 18.0 g/dL    Hematocrit 46.7 40.0 - 54.0 %     (H) 82 - 98 fL    MCH 30.7 27.0 - 31.0 pg    MCHC 29.8 (L) 32.0 - 36.0 g/dL    RDW 15.6 (H) 11.5 - 14.5 %    Platelets 206 150 - 450 K/uL    MPV 9.3 9.2 - 12.9 fL    Immature Granulocytes 0.8 (H) 0.0 - 0.5 %    Gran # (ANC) 7.8 (H) 1.8 - 7.7 K/uL    Immature Grans (Abs) 0.08 (H) 0.00 - 0.04 K/uL    Lymph # 0.7 (L) 1.0 - 4.8 K/uL    Mono # 0.8 0.3 - 1.0 K/uL    Eos # 0.1 0.0 - 0.5 K/uL    Baso # 0.03 0.00 - 0.20 K/uL    nRBC 0 0 /100 WBC    Gran % 81.5 (H) 38.0 - 73.0 %    Lymph % 7.6 (L) 18.0 - 48.0 %    Mono % 8.6 4.0 - 15.0 %    Eosinophil % 1.2 0.0 - 8.0 %    Basophil % 0.3 0.0 - 1.9 %    Differential Method Automated    Comprehensive metabolic panel   Result Value Ref Range    Sodium 144 136 - 145 mmol/L    Potassium 4.7 3.5 - 5.1 mmol/L    Chloride 99 95 - 110 mmol/L    CO2 35 (H) 23 - 29 mmol/L    Glucose 147  (H) 70 - 110 mg/dL    BUN 16 8 - 23 mg/dL    Creatinine 0.7 0.5 - 1.4 mg/dL    Calcium 9.0 8.7 - 10.5 mg/dL    Total Protein 7.6 6.0 - 8.4 g/dL    Albumin 3.1 (L) 3.5 - 5.2 g/dL    Total Bilirubin 0.3 0.1 - 1.0 mg/dL    Alkaline Phosphatase 74 55 - 135 U/L    AST 16 10 - 40 U/L    ALT 14 10 - 44 U/L    Anion Gap 10 8 - 16 mmol/L    eGFR >60 >60 mL/min/1.73 m^2   Brain natriuretic peptide   Result Value Ref Range    BNP 34 0 - 99 pg/mL   Troponin I   Result Value Ref Range    Troponin I <0.006 0.000 - 0.026 ng/mL   Lactic acid, plasma   Result Value Ref Range    Lactate (Lactic Acid) 0.7 0.5 - 2.2 mmol/L   Procalcitonin   Result Value Ref Range    Procalcitonin 0.02 <0.25 ng/mL   Urinalysis, Reflex to Urine Culture Urine, Catheterized    Specimen: Urine   Result Value Ref Range    Specimen UA Urine, Catheterized     Color, UA Yellow Yellow, Straw, Neelam    Appearance, UA Clear Clear    pH, UA 6.0 5.0 - 8.0    Specific Gravity, UA 1.020 1.005 - 1.030    Protein, UA 1+ (A) Negative    Glucose, UA Negative Negative    Ketones, UA Negative Negative    Bilirubin (UA) Negative Negative    Occult Blood UA Negative Negative    Nitrite, UA Negative Negative    Urobilinogen, UA 2.0-3.0 (A) <2.0 EU/dL    Leukocytes, UA Negative Negative   COVID-19 Rapid Screening   Result Value Ref Range    SARS-CoV-2 RNA, Amplification, Qual Positive (A) Negative   Urinalysis Microscopic   Result Value Ref Range    RBC, UA 0 0 - 4 /hpf    WBC, UA 1 0 - 5 /hpf    Bacteria None None-Occ /hpf    Hyaline Casts, UA 3 (A) 0-1/lpf /lpf    Microscopic Comment MUCUS PRESENT    C-reactive protein   Result Value Ref Range    CRP 21.1 (H) 0.0 - 8.2 mg/L   Procalcitonin   Result Value Ref Range    Procalcitonin 0.03 <0.25 ng/mL   Echo   Result Value Ref Range    TDI SEPTAL 0.06 m/s    LV LATERAL E/E' RATIO 5.88 m/s    LV SEPTAL E/E' RATIO 7.83 m/s    LA WIDTH 3.50 cm    Left Ventricular Outflow Tract Mean Velocity 0.61 cm/s    Left Ventricular Outflow  Tract Mean Gradient 1.59 mmHg    TDI LATERAL 0.08 m/s    LVIDd 3.77 3.5 - 6.0 cm    IVS 2.10 (A) 0.6 - 1.1 cm    Posterior Wall 1.82 (A) 0.6 - 1.1 cm    Ao root annulus 4.61 cm    LVIDs 2.68 2.1 - 4.0 cm    FS 29 28 - 44 %    LA volume 44.72 cm3    Sinus 4.75 cm    STJ 4.20 cm    Ascending aorta 4.04 cm    LV mass 334.38 g    LA size 3.59 cm    RVDD 4.45 cm    Left Ventricle Relative Wall Thickness 0.97 cm    AV mean gradient 4 mmHg    AV valve area 2.73 cm2    AV Velocity Ratio 0.60     AV index (prosthetic) 0.60     MV valve area p 1/2 method 2.78 cm2    E/A ratio 1.15     Mean e' 0.07 m/s    E wave deceleration time 273.04 msec    IVRT 91.34 msec    LVOT diameter 2.40 cm    LVOT area 4.5 cm2    LVOT peak fernando 0.71 m/s    LVOT peak VTI 13.70 cm    Ao peak fernando 1.18 m/s    Ao VTI 22.7 cm    RVOT peak fernando 0.73 m/s    RVOT peak VTI 11.3 cm    LVOT stroke volume 61.95 cm3    AV peak gradient 6 mmHg    PV mean gradient 0.99 mmHg    E/E' ratio 6.71 m/s    MV Peak E Fernando 0.47 m/s    TR Max Fernando 2.14 m/s    MV stenosis pressure 1/2 time 79.18 ms    MV Peak A Fernando 0.41 m/s    LV Systolic Volume 26.50 mL    LV Diastolic Volume 60.60 mL    RA Major Axis 4.43 cm    Left Atrium Minor Axis 4.08 cm    Left Atrium Major Axis 4.30 cm    Triscuspid Valve Regurgitation Peak Gradient 18 mmHg    RA Width 2.90 cm    EF 50 %   ISTAT PROCEDURE   Result Value Ref Range    POC PH 7.257 (LL) 7.35 - 7.45    POC PCO2 98.5 (HH) 35 - 45 mmHg    POC PO2 71 (L) 80 - 100 mmHg    POC HCO3 43.9 (H) 24 - 28 mmol/L    POC BE 17 -2 to 2 mmol/L    POC SATURATED O2 89 (L) 95 - 100 %    Rate 20     Sample ARTERIAL     Site RR     Allens Test Pass     DelSys Adult Vent     Mode AC/PRVC     Vt 450     PEEP 8     FiO2 100    ISTAT PROCEDURE   Result Value Ref Range    POC PH 7.408 7.35 - 7.45    POC PCO2 56.1 (HH) 35 - 45 mmHg    POC PO2 67 (L) 80 - 100 mmHg    POC HCO3 35.4 (H) 24 - 28 mmol/L    POC BE 11 -2 to 2 mmol/L    POC SATURATED O2 93 (L) 95 - 100 %     Rate 30     Sample ARTERIAL     Site RR     Allens Test Pass     DelSys Adult Vent     Mode AC/PRVC     Vt 470     PEEP 10     FiO2 100         Significant Imaging:     Imaging Results              CTA Chest Non-Coronary (PE Studies) (Final result)  Result time 10/30/22 13:44:24      Final result by Raheem Hendrickson MD (10/30/22 13:44:24)                   Impression:      1. No pulmonary embolism.  2. Mucous plug in the right mainstem bronchus with total atelectasis of the right middle lobe and right lower lobe.  3. Marked dependent atelectasis in the basilar segments left lower lobe and dependent atelectasis in the right upper lobe.  4. Extensive calcific atherosclerotic disease of the coronary arteries.  All CT scans at this facility are performed  using dose modulation techniques as appropriate to performed exam including the following:  automated exposure control; adjustment of mA and/or kV according to the patients size (this includes techniques or standardized protocols for targeted exams where dose is matched to indication/reason for exam: i.e. extremities or head);  iterative reconstruction technique.      Electronically signed by: Raheem Hendrickson MD  Date:    10/30/2022  Time:    13:44               Narrative:    EXAMINATION:  CTA CHEST NON CORONARY (PE STUDIES)    CLINICAL HISTORY:  Pulmonary embolism (PE) suspected, high prob;    TECHNIQUE:  Axial CTA images performed through the chest after the administration of 100 cc intravenous contrast. 3D MIP images were performed and interpreted.    COMPARISON:  None    FINDINGS:  No filling defects in the pulmonary arteries to indicate a pulmonary embolism.  Calcific atherosclerotic disease of the coronary arteries.  No pericardial effusion.  NG tube tip in the fundus of the stomach.  Right IJ central venous catheter tip in the SVC.    There is a mucous plug in the right mainstem bronchus with total atelectasis of the right middle lobe and right lower lobe.  There is  dependent atelectasis in the right upper lobe.  There is marked atelectasis in the basilar segments of the left lower lobe.  No pleural effusion or pneumothorax.    No acute osseous abnormality.                                       X-Ray Chest 1 View (Final result)  Result time 10/30/22 11:06:21      Final result by Raheem Hendrickson MD (10/30/22 11:06:21)                   Impression:      See above      Electronically signed by: Raheem Hendrickson MD  Date:    10/30/2022  Time:    11:06               Narrative:    EXAMINATION:  XR CHEST 1 VIEW    CLINICAL HISTORY:  Central line plcmnt;    COMPARISON:  Chest x-ray performed less than 2 hours ago    FINDINGS:  Endotracheal tube in good position.  NG tube tip in the fundus of the stomach.  Newly placed right IJ central venous catheter tip in the SVC.  No pneumothorax.  Right middle lobe and right lower lobe total atelectasis.  Marked left basilar atelectasis.                                       CT Head Without Contrast (Final result)  Result time 10/30/22 09:52:01      Final result by Raheem Hendrickson MD (10/30/22 09:52:01)                   Impression:      1. No acute intracranial process.  2. 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  3. Fluid filling the right middle ear cavity which can be seen with otitis media.  4. Paranasal sinus disease.  All CT scans at this facility are performed  using dose modulation techniques as appropriate to performed exam including the following:  automated exposure control; adjustment of mA and/or kV according to the patients size (this includes techniques or standardized protocols for targeted exams where dose is matched to indication/reason for exam: i.e. extremities or head);  iterative reconstruction technique.      Electronically signed by: Raheem Hendrickson MD  Date:    10/30/2022  Time:    09:52               Narrative:    EXAMINATION:  CT HEAD WITHOUT  CONTRAST    CLINICAL HISTORY:  Mental status change, unknown cause;    TECHNIQUE:  Axial CT imaging was performed through the head without intravenous contrast.    COMPARISON:  None    FINDINGS:  No hydrocephalus, midline shift, mass effect, or acute intracranial hemorrhage. Chronic small vessel ischemic change of the white matter.  Old lacunar infarcts in the left caudate nucleus head and body.  Mucosal thickening of the paranasal sinuses.  Fluid filling the right middle ear cavity..  The skull is intact.                                       X-Ray Chest AP Portable (Final result)  Result time 10/30/22 09:26:02      Final result by Raheem Hendrickson MD (10/30/22 09:26:02)                   Impression:      See above      Electronically signed by: Raheem Hendrickson MD  Date:    10/30/2022  Time:    09:26               Narrative:    EXAMINATION:  XR CHEST AP PORTABLE    CLINICAL HISTORY:  Presence of other specified devices    COMPARISON:  None.    FINDINGS:  Endotracheal tube in good position.  The NG tube courses down the esophagus with its tip in the fundus of the stomach.  There are markedly elevated hemidiaphragms with marked low lung volumes.  Right middle lobe and right lower lobe total atelectasis with a cut off sign of the right mainstem bronchus.  Marked left basilar atelectasis.  No pneumothorax.                                       Assessment/Plan:     Right middle lobe pneumonia  CTA chest: No pulmonary embolism. Mucous plug in the right mainstem bronchus with total atelectasis of the right middle lobe and right lower lobe.   Marked dependent atelectasis in the basilar segments left lower lobe and dependent atelectasis in the right upper lobe. Extensive calcific atherosclerotic disease of the coronary arteries.  All CT scans at this facility are performed  using dose modulation techniques as appropriate to performed exam including the following:  automated exposure control; adjustment of mA and/or kV according  to the patients size (this includes techniques or standardized protocols for targeted exams where dose is matched to indication/reason for exam: i.e. extremities or head);  iterative reconstruction technique.Sputum culture  ? Aspiration pneumonia with mucus plug;   Possible plan for bronchoscopy, antibiotics, follow-up on cultures      COVID-19 virus antibody detected  COVID positive, intubated  Remedesvir, dexamethasone      On mechanically assisted ventilation  Intubated  Spontaneous breathing trials   ABG      Acute deep vein thrombosis (DVT) of tibial vein of both lower extremities  On Eliquis  CTA chest negative for PE      Hypertension  Home meds  Trops -ve  echo      Atelectasis  Bronchodilators  Possible bronch for mucus plugs         VTE Risk Mitigation (From admission, onward)         Ordered     IP VTE HIGH RISK PATIENT  Once         10/30/22 1513     Place MELANIE hose  Until discontinued         10/30/22 1214     Place sequential compression device  Until discontinued         10/30/22 1214     Place sequential compression device  Until discontinued         10/30/22 1211                   Guerline Arevalo MD  Department of Hospital Medicine   [facility] - Emergency Dept.

## 2022-10-30 NOTE — HPI
Brady Haines is 72 y.o.  Seen in ER  Spouse at bedside  Brought from [facility] found down< Bagged and intubated in ER  Unresponsive, Hypothermia, elevated pCO2  Unable to intubate in field  Got rocoronium + etomidate  pCO2 was 98  Never smoker, deny COPD, marie,   HX long hospitalization May 2022: [facility]: Spine Abcess: Staphy and acinetobacter  Was in NH  Unable to do PT due to Knee issues  T max: 95.1 F      Meds reveiwed: on Eliquis      No past medical history on file.

## 2022-10-30 NOTE — PHARMACY MED REC
"Admission Medication History     The home medication history was taken by Alberto Moore.    You may go to "Admission" then "Reconcile Home Medications" tabs to review and/or act upon these items.     The home medication list has been updated by the Pharmacy department.   Please read ALL comments highlighted in yellow.   Please address this information as you see fit.    Feel free to contact us if you have any questions or require assistance.      Medications listed below were obtained from: Nursing home: [facility]  (Not in a hospital admission)        Alberto Moore  [id number]      Current Outpatient Medications on File Prior to Encounter   Medication Sig Dispense Refill Last Dose    amLODIPine (NORVASC) 5 MG tablet Take 1 tablet by mouth once daily.   10/29/2022    apixaban (ELIQUIS) 5 mg Tab Take 5 mg by mouth 2 (two) times daily.   10/29/2022    aspirin (ECOTRIN) 81 MG EC tablet Take 81 mg by mouth once daily.   10/29/2022    atorvastatin (LIPITOR) 10 MG tablet Take 1 tablet by mouth once daily.   10/29/2022    buPROPion (WELLBUTRIN XL) 300 MG 24 hr tablet Take 1 tablet by mouth once daily.   10/29/2022    folic acid (FOLVITE) 1 MG tablet Take 1 tablet by mouth once daily.   10/29/2022    gabapentin (NEURONTIN) 400 MG capsule Take 400 mg by mouth 3 (three) times daily.   10/29/2022    losartan (COZAAR) 50 MG tablet Take 1 tablet by mouth once daily.   10/29/2022    melatonin 10 mg Tab Take 10 mg by mouth every evening.   10/29/2022    metoprolol succinate (TOPROL-XL) 50 MG 24 hr tablet Take 50 mg by mouth once daily.   10/29/2022    ondansetron (ZOFRAN) 4 MG tablet Take 4 mg by mouth every 4 (four) hours as needed for Nausea.   10/30/2022    senna-docusate 8.6-50 mg (PERICOLACE) 8.6-50 mg per tablet Take 1 tablet by mouth every evening.   10/29/2022    temazepam (RESTORIL) 15 mg Cap Take 15 mg by mouth nightly as needed for Insomnia.   10/29/2022                           .        "

## 2022-10-30 NOTE — ASSESSMENT & PLAN NOTE
CTA chest: No pulmonary embolism. Mucous plug in the right mainstem bronchus with total atelectasis of the right middle lobe and right lower lobe.   Marked dependent atelectasis in the basilar segments left lower lobe and dependent atelectasis in the right upper lobe. Extensive calcific atherosclerotic disease of the coronary arteries.  All CT scans at this facility are performed  using dose modulation techniques as appropriate to performed exam including the following:  automated exposure control; adjustment of mA and/or kV according to the patients size (this includes techniques or standardized protocols for targeted exams where dose is matched to indication/reason for exam: i.e. extremities or head);  iterative reconstruction technique.Sputum culture  ? Aspiration pneumonia with mucus plug;   Possible plan for bronchoscopy, antibiotics, follow-up on cultures

## 2022-10-30 NOTE — CONSULTS
[facility] - Emergency Dept.  Critical Care Medicine  Consult Note    Patient Name: Brady Haines  MRN: [id number]  Admission Date: 10/30/2022  Hospital Length of Stay: 0 days  Code Status: Full Code  Attending Physician: Guerline Arevalo, *   Primary Care Provider: Mickie Hui MD   Principal Problem: <principal problem not specified>    [unfilled]  Subjective:     HPI:  Brady Haines is 72 y.o.  Seen in ER  Spouse at bedside  Brought from [facility] found down< Bagged and intubated in ER  Unresponsive, Hypothermia, elevated pCO@  Unable to intubate in field  Got rocoronium + etomidate  pCO2 was 98  Never smoker, deny COPD, marie,   HX long hospitalization May 2022: [facility]: Spine Abcess: Staphy and acinetobacter  Was in NH  Unable to do PT due to Knee issues  T max: 95.1 F      Meds reveiwed: on Eliquis      No past medical history on file.       Hospital/ICU Course:  No notes on file    No new subjective & objective note has been filed under this hospital service since the last note was generated.      ABG  Recent Labs   Lab 10/30/22  1048   PH 7.408   PO2 67*   PCO2 56.1*   HCO3 35.4*   BE 11     Assessment/Plan:     Pulmonary  Right middle lobe pneumonia  Sputum culture  Possible aspiration  Abx: CEFEPIME  Chest CT reviewed  Blood cultures    On mechanically assisted ventilation  Pulmonary:  Continue ventilator support.            Ventilator Data (Last 24H):     Vent Mode: A/C  Oxygen Concentration (%):  [100] 100  Resp Rate Total:  [0 br/min-30 br/min] 30 br/min  Vt Set:  [450 mL-470 mL] 470 mL  PEEP/CPAP:  [8 cmH20-10 cmH20] 10 cmH20  Pressure Support:  [0 cmH20] 0 cmH20  Mean Airway Pressure:  [0 pkZ34-75 cmH20] 19 cmH20      Ventilator settings reviewed and adjusted to optimize gas exchange.      Proceed with weaning trials on a support/weaning mode on the ventilator when the following goals are met:  Patient Criteria:  - Spontaneous breathing in CPAP  - Able to lift head off  pillow  - Hemodynamic stability  - HR: 60 to 110 bpm  - ABP: greater than 90/50  - RR: 10 to 28 bpm  - Secretion management/adequate cough  - Acceptable arterial blood gases     Ventilator Criteria:  - FiO2: less than or equal to 40%  - PEEP less than or equal to 8 to 10 cm H2O     Bronchodilators per protocol.    Sedation: Fentanyl + Propofol    Serial ABG    AM CXR    Tube feeds    Miralax, Colace    Sliding scale    On ELIQUIS    Atelectasis  Bronchodilators  Sputum culture  Acetylcysteine + hypersal      Acute respiratory failure with hypoxia  Patient with Hypercapnic and Hypoxic Respiratory failure which is Acute on chronic.  he is not on home oxygen. Supplemental oxygen was provided and noted- Vent Mode: A/C  Oxygen Concentration (%):  [100] 100  Resp Rate Total:  [0 br/min-30 br/min] 30 br/min  Vt Set:  [450 mL-470 mL] 470 mL  PEEP/CPAP:  [8 cmH20-10 cmH20] 10 cmH20  Pressure Support:  [0 cmH20] 0 cmH20  Mean Airway Pressure:  [0 dhS89-55 cmH20] 19 cmH20.   Signs/symptoms of respiratory failure include- increased work of breathing, lethargy and cyanosis. Contributing diagnoses includes - Pneumonia Labs and images were reviewed. Patient Has recent ABG, which has been reviewed. Will treat underlying causes and adjust management of respiratory failure as follows-         Cardiac/Vascular  Hypertension  Home meds: NORVASC, LOSARTAN.    Hyperlipidemia  MED: LIPITOR    ID  COVID-19 virus antibody detected  Patient is vaccinated    COVID risk score 4     REMDESIVIR  DEXAMETHASONE  ZINC  Vit C  resp path PCR               Hematology  Acute deep vein thrombosis (DVT) of tibial vein of both lower extremities  On ELIQUIS    GI  Gastroesophageal reflux disease without esophagitis  PEPCID  Tube feed recs        Critical Care Daily Checklist:    A: Awake: RASS Goal/Actual Goal:    Actual:     B: Spontaneous Breathing Trial Performed?     C: SAT & SBT Coordinated?  N/A                      D: Delirium: CAM-ICU     E: Early  Mobility Performed? Yes   F: Feeding Goal:    Status:     Current Diet Order   Procedures    Diet NPO      AS: Analgesia/Sedation YES   T: Thromboembolic Prophylaxis Elliquis   H: HOB > 300 Yes   U: Stress Ulcer Prophylaxis (if needed) YES   G: Glucose Control SSI   B: Bowel Function     I: Indwelling Catheter (Lines & Rojas) Necessity TLC   D: De-escalation of Antimicrobials/Pharmacotherapies CEFEPIME    Plan for the day/ETD Cont on going care    Code Status:  Family/Goals of Care: Full Code  updated     Critical Care Time: 56 minutes  Critical secondary to Patient has a condition that poses threat to life and bodily function: Severe Respiratory Distress and MECHANICAL VENTILATION, COVID RISK SCORE 4  Patient is currently on drug therapy requiring intensive monitoring for toxicity: PROPOFOL  Patient is currently receiving parenteral controlled substances: FEntanyl     Critical care was time spent personally by me on the following activities: development of treatment plan with patient or surrogate and bedside caregivers, discussions with consultants, evaluation of patient's response to treatment, examination of patient, ordering and performing treatments and interventions, ordering and review of laboratory studies, ordering and review of radiographic studies, pulse oximetry, re-evaluation of patient's condition. This critical care time did not overlap with that of any other provider or involve time for any procedures.    Thank you for your consult. I will follow-up with patient. Please contact us if you have any additional questions.     Luther Amaral MD  Critical Care Medicine  [facility] - Emergency Dept.

## 2022-10-30 NOTE — ASSESSMENT & PLAN NOTE
Patient with Hypercapnic and Hypoxic Respiratory failure which is Acute on chronic.  he is not on home oxygen. Supplemental oxygen was provided and noted- Vent Mode: A/C  Oxygen Concentration (%):  [100] 100  Resp Rate Total:  [0 br/min-30 br/min] 30 br/min  Vt Set:  [450 mL-470 mL] 470 mL  PEEP/CPAP:  [8 cmH20-10 cmH20] 10 cmH20  Pressure Support:  [0 cmH20] 0 cmH20  Mean Airway Pressure:  [0 jfY39-39 cmH20] 19 cmH20.   Signs/symptoms of respiratory failure include- increased work of breathing, lethargy and cyanosis. Contributing diagnoses includes - Pneumonia Labs and images were reviewed. Patient Has recent ABG, which has been reviewed. Will treat underlying causes and adjust management of respiratory failure as follows-

## 2022-10-30 NOTE — SUBJECTIVE & OBJECTIVE
Past Medical History:   Diagnosis Date    Essential (primary) hypertension     Mixed hyperlipidemia        History reviewed. No pertinent surgical history.    Review of patient's allergies indicates:  Not on File    No current facility-administered medications on file prior to encounter.     Current Outpatient Medications on File Prior to Encounter   Medication Sig    amLODIPine (NORVASC) 5 MG tablet Take 1 tablet by mouth once daily.    apixaban (ELIQUIS) 5 mg Tab Take 5 mg by mouth 2 (two) times daily.    aspirin (ECOTRIN) 81 MG EC tablet Take 81 mg by mouth once daily.    atorvastatin (LIPITOR) 10 MG tablet Take 1 tablet by mouth once daily.    buPROPion (WELLBUTRIN XL) 300 MG 24 hr tablet Take 1 tablet by mouth once daily.    folic acid (FOLVITE) 1 MG tablet Take 1 tablet by mouth once daily.    gabapentin (NEURONTIN) 400 MG capsule Take 400 mg by mouth 3 (three) times daily.    losartan (COZAAR) 50 MG tablet Take 1 tablet by mouth once daily.    melatonin 10 mg Tab Take 10 mg by mouth every evening.    metoprolol succinate (TOPROL-XL) 50 MG 24 hr tablet Take 50 mg by mouth once daily.    ondansetron (ZOFRAN) 4 MG tablet Take 4 mg by mouth every 4 (four) hours as needed for Nausea.    senna-docusate 8.6-50 mg (PERICOLACE) 8.6-50 mg per tablet Take 1 tablet by mouth every evening.    temazepam (RESTORIL) 15 mg Cap Take 15 mg by mouth nightly as needed for Insomnia.     Family History    None       Tobacco Use    Smoking status: Not on file    Smokeless tobacco: Not on file   Substance and Sexual Activity    Alcohol use: Not on file    Drug use: Not on file    Sexual activity: Not on file     Review of Systems    Unable to obtain review of system, patient intubated and sedated at the time of examination.    Objective:     Vital Signs (Most Recent):  Temp: 99.7 °F (37.6 °C) (10/30/22 1515)  Pulse: 79 (10/30/22 1512)  Resp: (!) 30 (10/30/22 1512)  BP: 115/77 (10/30/22 1512)  SpO2: 98 % (10/30/22 1512)   Vital Signs  (24h Range):  Temp:  [95 °F (35 °C)-99.7 °F (37.6 °C)] 99.7 °F (37.6 °C)  Pulse:  [71-81] 79  Resp:  [20-31] 30  SpO2:  [92 %-99 %] 98 %  BP: ()/(63-98) 115/77     Weight: 127 kg (280 lb)  There is no height or weight on file to calculate BMI.    Physical Exam        Constitutional: Patient is in severe distress. Well-developed and well-nourished.  Head: Atraumatic. Normocephalic.  Eyes: PERRL. EOM intact. Conjunctivae are not pale. No scleral icterus.  ENT: dentures  Neck: Supple. Full ROM. No lymphadenopathy.  Cardiovascular: Regular rate. Regular rhythm. No murmurs, rubs, or gallops. Distal pulses are 2+ and symmetric.  Pulmonary/Chest: BVM in process  Abdominal: Soft and non-distended.  There is no tenderness.  No rebound, guarding, or rigidity.  Musculoskeletal: No edema.   Skin: Warm and dry.  Neurological:  intubated and sedated;     Significant Labs:     Results for orders placed or performed during the hospital encounter of 10/30/22   Influenza A & B by Molecular    Specimen: Nasopharyngeal Swab   Result Value Ref Range    Influenza A, Molecular Negative Negative    Influenza B, Molecular Negative Negative    Flu A & B Source Nasal swab    CBC auto differential   Result Value Ref Range    WBC 9.53 3.90 - 12.70 K/uL    RBC 4.53 (L) 4.60 - 6.20 M/uL    Hemoglobin 13.9 (L) 14.0 - 18.0 g/dL    Hematocrit 46.7 40.0 - 54.0 %     (H) 82 - 98 fL    MCH 30.7 27.0 - 31.0 pg    MCHC 29.8 (L) 32.0 - 36.0 g/dL    RDW 15.6 (H) 11.5 - 14.5 %    Platelets 206 150 - 450 K/uL    MPV 9.3 9.2 - 12.9 fL    Immature Granulocytes 0.8 (H) 0.0 - 0.5 %    Gran # (ANC) 7.8 (H) 1.8 - 7.7 K/uL    Immature Grans (Abs) 0.08 (H) 0.00 - 0.04 K/uL    Lymph # 0.7 (L) 1.0 - 4.8 K/uL    Mono # 0.8 0.3 - 1.0 K/uL    Eos # 0.1 0.0 - 0.5 K/uL    Baso # 0.03 0.00 - 0.20 K/uL    nRBC 0 0 /100 WBC    Gran % 81.5 (H) 38.0 - 73.0 %    Lymph % 7.6 (L) 18.0 - 48.0 %    Mono % 8.6 4.0 - 15.0 %    Eosinophil % 1.2 0.0 - 8.0 %    Basophil %  0.3 0.0 - 1.9 %    Differential Method Automated    Comprehensive metabolic panel   Result Value Ref Range    Sodium 144 136 - 145 mmol/L    Potassium 4.7 3.5 - 5.1 mmol/L    Chloride 99 95 - 110 mmol/L    CO2 35 (H) 23 - 29 mmol/L    Glucose 147 (H) 70 - 110 mg/dL    BUN 16 8 - 23 mg/dL    Creatinine 0.7 0.5 - 1.4 mg/dL    Calcium 9.0 8.7 - 10.5 mg/dL    Total Protein 7.6 6.0 - 8.4 g/dL    Albumin 3.1 (L) 3.5 - 5.2 g/dL    Total Bilirubin 0.3 0.1 - 1.0 mg/dL    Alkaline Phosphatase 74 55 - 135 U/L    AST 16 10 - 40 U/L    ALT 14 10 - 44 U/L    Anion Gap 10 8 - 16 mmol/L    eGFR >60 >60 mL/min/1.73 m^2   Brain natriuretic peptide   Result Value Ref Range    BNP 34 0 - 99 pg/mL   Troponin I   Result Value Ref Range    Troponin I <0.006 0.000 - 0.026 ng/mL   Lactic acid, plasma   Result Value Ref Range    Lactate (Lactic Acid) 0.7 0.5 - 2.2 mmol/L   Procalcitonin   Result Value Ref Range    Procalcitonin 0.02 <0.25 ng/mL   Urinalysis, Reflex to Urine Culture Urine, Catheterized    Specimen: Urine   Result Value Ref Range    Specimen UA Urine, Catheterized     Color, UA Yellow Yellow, Straw, Neelam    Appearance, UA Clear Clear    pH, UA 6.0 5.0 - 8.0    Specific Gravity, UA 1.020 1.005 - 1.030    Protein, UA 1+ (A) Negative    Glucose, UA Negative Negative    Ketones, UA Negative Negative    Bilirubin (UA) Negative Negative    Occult Blood UA Negative Negative    Nitrite, UA Negative Negative    Urobilinogen, UA 2.0-3.0 (A) <2.0 EU/dL    Leukocytes, UA Negative Negative   COVID-19 Rapid Screening   Result Value Ref Range    SARS-CoV-2 RNA, Amplification, Qual Positive (A) Negative   Urinalysis Microscopic   Result Value Ref Range    RBC, UA 0 0 - 4 /hpf    WBC, UA 1 0 - 5 /hpf    Bacteria None None-Occ /hpf    Hyaline Casts, UA 3 (A) 0-1/lpf /lpf    Microscopic Comment MUCUS PRESENT    C-reactive protein   Result Value Ref Range    CRP 21.1 (H) 0.0 - 8.2 mg/L   Procalcitonin   Result Value Ref Range    Procalcitonin  0.03 <0.25 ng/mL   Echo   Result Value Ref Range    TDI SEPTAL 0.06 m/s    LV LATERAL E/E' RATIO 5.88 m/s    LV SEPTAL E/E' RATIO 7.83 m/s    LA WIDTH 3.50 cm    Left Ventricular Outflow Tract Mean Velocity 0.61 cm/s    Left Ventricular Outflow Tract Mean Gradient 1.59 mmHg    TDI LATERAL 0.08 m/s    LVIDd 3.77 3.5 - 6.0 cm    IVS 2.10 (A) 0.6 - 1.1 cm    Posterior Wall 1.82 (A) 0.6 - 1.1 cm    Ao root annulus 4.61 cm    LVIDs 2.68 2.1 - 4.0 cm    FS 29 28 - 44 %    LA volume 44.72 cm3    Sinus 4.75 cm    STJ 4.20 cm    Ascending aorta 4.04 cm    LV mass 334.38 g    LA size 3.59 cm    RVDD 4.45 cm    Left Ventricle Relative Wall Thickness 0.97 cm    AV mean gradient 4 mmHg    AV valve area 2.73 cm2    AV Velocity Ratio 0.60     AV index (prosthetic) 0.60     MV valve area p 1/2 method 2.78 cm2    E/A ratio 1.15     Mean e' 0.07 m/s    E wave deceleration time 273.04 msec    IVRT 91.34 msec    LVOT diameter 2.40 cm    LVOT area 4.5 cm2    LVOT peak fernando 0.71 m/s    LVOT peak VTI 13.70 cm    Ao peak fernando 1.18 m/s    Ao VTI 22.7 cm    RVOT peak fernando 0.73 m/s    RVOT peak VTI 11.3 cm    LVOT stroke volume 61.95 cm3    AV peak gradient 6 mmHg    PV mean gradient 0.99 mmHg    E/E' ratio 6.71 m/s    MV Peak E Fernando 0.47 m/s    TR Max Fernando 2.14 m/s    MV stenosis pressure 1/2 time 79.18 ms    MV Peak A Fernando 0.41 m/s    LV Systolic Volume 26.50 mL    LV Diastolic Volume 60.60 mL    RA Major Axis 4.43 cm    Left Atrium Minor Axis 4.08 cm    Left Atrium Major Axis 4.30 cm    Triscuspid Valve Regurgitation Peak Gradient 18 mmHg    RA Width 2.90 cm    EF 50 %   ISTAT PROCEDURE   Result Value Ref Range    POC PH 7.257 (LL) 7.35 - 7.45    POC PCO2 98.5 (HH) 35 - 45 mmHg    POC PO2 71 (L) 80 - 100 mmHg    POC HCO3 43.9 (H) 24 - 28 mmol/L    POC BE 17 -2 to 2 mmol/L    POC SATURATED O2 89 (L) 95 - 100 %    Rate 20     Sample ARTERIAL     Site RR     Allens Test Pass     DelSys Adult Vent     Mode AC/PRVC     Vt 450     PEEP 8     FiO2 100     ISTAT PROCEDURE   Result Value Ref Range    POC PH 7.408 7.35 - 7.45    POC PCO2 56.1 (HH) 35 - 45 mmHg    POC PO2 67 (L) 80 - 100 mmHg    POC HCO3 35.4 (H) 24 - 28 mmol/L    POC BE 11 -2 to 2 mmol/L    POC SATURATED O2 93 (L) 95 - 100 %    Rate 30     Sample ARTERIAL     Site RR     Allens Test Pass     DelSys Adult Vent     Mode AC/PRVC     Vt 470     PEEP 10     FiO2 100         Significant Imaging:     Imaging Results              CTA Chest Non-Coronary (PE Studies) (Final result)  Result time 10/30/22 13:44:24      Final result by Raheem Hendrickson MD (10/30/22 13:44:24)                   Impression:      1. No pulmonary embolism.  2. Mucous plug in the right mainstem bronchus with total atelectasis of the right middle lobe and right lower lobe.  3. Marked dependent atelectasis in the basilar segments left lower lobe and dependent atelectasis in the right upper lobe.  4. Extensive calcific atherosclerotic disease of the coronary arteries.  All CT scans at this facility are performed  using dose modulation techniques as appropriate to performed exam including the following:  automated exposure control; adjustment of mA and/or kV according to the patients size (this includes techniques or standardized protocols for targeted exams where dose is matched to indication/reason for exam: i.e. extremities or head);  iterative reconstruction technique.      Electronically signed by: Raheem Hendrickson MD  Date:    10/30/2022  Time:    13:44               Narrative:    EXAMINATION:  CTA CHEST NON CORONARY (PE STUDIES)    CLINICAL HISTORY:  Pulmonary embolism (PE) suspected, high prob;    TECHNIQUE:  Axial CTA images performed through the chest after the administration of 100 cc intravenous contrast. 3D MIP images were performed and interpreted.    COMPARISON:  None    FINDINGS:  No filling defects in the pulmonary arteries to indicate a pulmonary embolism.  Calcific atherosclerotic disease of the coronary arteries.  No  pericardial effusion.  NG tube tip in the fundus of the stomach.  Right IJ central venous catheter tip in the SVC.    There is a mucous plug in the right mainstem bronchus with total atelectasis of the right middle lobe and right lower lobe.  There is dependent atelectasis in the right upper lobe.  There is marked atelectasis in the basilar segments of the left lower lobe.  No pleural effusion or pneumothorax.    No acute osseous abnormality.                                       X-Ray Chest 1 View (Final result)  Result time 10/30/22 11:06:21      Final result by Raheem Hendrickson MD (10/30/22 11:06:21)                   Impression:      See above      Electronically signed by: Raheem Hendrickson MD  Date:    10/30/2022  Time:    11:06               Narrative:    EXAMINATION:  XR CHEST 1 VIEW    CLINICAL HISTORY:  Central line plcmnt;    COMPARISON:  Chest x-ray performed less than 2 hours ago    FINDINGS:  Endotracheal tube in good position.  NG tube tip in the fundus of the stomach.  Newly placed right IJ central venous catheter tip in the SVC.  No pneumothorax.  Right middle lobe and right lower lobe total atelectasis.  Marked left basilar atelectasis.                                       CT Head Without Contrast (Final result)  Result time 10/30/22 09:52:01      Final result by Raheem Hendrickson MD (10/30/22 09:52:01)                   Impression:      1. No acute intracranial process.  2. 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  3. Fluid filling the right middle ear cavity which can be seen with otitis media.  4. Paranasal sinus disease.  All CT scans at this facility are performed  using dose modulation techniques as appropriate to performed exam including the following:  automated exposure control; adjustment of mA and/or kV according to the patients size (this includes techniques or standardized protocols for targeted exams where dose is matched to  indication/reason for exam: i.e. extremities or head);  iterative reconstruction technique.      Electronically signed by: Raheem Hendrickson MD  Date:    10/30/2022  Time:    09:52               Narrative:    EXAMINATION:  CT HEAD WITHOUT CONTRAST    CLINICAL HISTORY:  Mental status change, unknown cause;    TECHNIQUE:  Axial CT imaging was performed through the head without intravenous contrast.    COMPARISON:  None    FINDINGS:  No hydrocephalus, midline shift, mass effect, or acute intracranial hemorrhage. Chronic small vessel ischemic change of the white matter.  Old lacunar infarcts in the left caudate nucleus head and body.  Mucosal thickening of the paranasal sinuses.  Fluid filling the right middle ear cavity..  The skull is intact.                                       X-Ray Chest AP Portable (Final result)  Result time 10/30/22 09:26:02      Final result by Raheem Hendrickson MD (10/30/22 09:26:02)                   Impression:      See above      Electronically signed by: Raheem Hendrickson MD  Date:    10/30/2022  Time:    09:26               Narrative:    EXAMINATION:  XR CHEST AP PORTABLE    CLINICAL HISTORY:  Presence of other specified devices    COMPARISON:  None.    FINDINGS:  Endotracheal tube in good position.  The NG tube courses down the esophagus with its tip in the fundus of the stomach.  There are markedly elevated hemidiaphragms with marked low lung volumes.  Right middle lobe and right lower lobe total atelectasis with a cut off sign of the right mainstem bronchus.  Marked left basilar atelectasis.  No pneumothorax.

## 2022-10-30 NOTE — ED PROVIDER NOTES
SCRIBE #1 NOTE: I, Terry Sherman, am scribing for, and in the presence of, Eddi Solano MD. I have scribed the entire note.       History     Chief Complaint   Patient presents with    unresponsive     Pt found unresponsive at nursing home     Review of patient's allergies indicates:  Not on File      History of Present Illness     HPI    10/30/2022, 8:52 AM  History obtained from the  EMS      History of Present Illness: Brady Haines is a 72 y.o. male patient with a PMHx of acute respiratory failure who presents to the Emergency Department for evaluation of unresponsiveness. Pt was found unresponsive at a nursing home this morning. Per EMS, pt was found with agonal breathing and O2 sat of 40%. Pt was found with blood glucose of 100. EMS tried to intubate en route but was unsuccessful. EMS was also unsuccessful at starting an IV. Per EMS, pt responds to painful stimuli. Symptoms are constant and moderate in severity. HPI and ROS limited due to pt being unresponsive.      Arrival mode: AASI    PCP: Mickie Hui MD        Past Medical History:  Past Medical History:   Diagnosis Date    Essential (primary) hypertension     Mixed hyperlipidemia        Past Surgical History:  History reviewed. No pertinent surgical history.      Family History:  No family history on file.    Social History:  Social History     Tobacco Use    Smoking status: Not on file    Smokeless tobacco: Not on file   Substance and Sexual Activity    Alcohol use: Not on file    Drug use: Not on file    Sexual activity: Not on file        Review of Systems     Review of Systems   Unable to perform ROS: Patient unresponsive      Physical Exam     Initial Vitals   BP Pulse Resp Temp SpO2   10/30/22 0840 10/30/22 0840 10/30/22 0840 10/30/22 1000 10/30/22 0840   130/78 79 (!) 27 (!) 95.1 °F (35.1 °C) (!) 92 %      MAP       --                 Physical Exam  Nursing Notes and Vital Signs Reviewed.  Constitutional: Patient is in severe distress.  Well-developed and well-nourished.  Head: Atraumatic. Normocephalic.  Eyes: PERRL. EOM intact. Conjunctivae are not pale. No scleral icterus.  ENT: dentures  Neck: Supple. Full ROM. No lymphadenopathy.  Cardiovascular: Regular rate. Regular rhythm. No murmurs, rubs, or gallops. Distal pulses are 2+ and symmetric.  Pulmonary/Chest: BVM in process  Abdominal: Soft and non-distended.  There is no tenderness.  No rebound, guarding, or rigidity.  Musculoskeletal: No edema.   Skin: Warm and dry.  Neurological:  GCS 6        ED Course   Intubation    Date/Time: 10/30/2022 8:52 AM  Location procedure was performed: [facility] EMERGENCY DEPARTMENT  Performed by: Eddi Solano MD  Authorized by: Eddi Solano MD   Consent Done: Emergent Situation  Indications: respiratory failure and hypoxemia  Intubation method: direct  Patient status: paralyzed (RSI)  Preoxygenation: BVM  Sedatives: etomidate  Paralytic: rocuronium  Laryngoscope size: Mac 4  Tube size: 8.0 mm  Tube type: cuffed  Number of attempts: 1  Cords visualized: yes  Post-procedure assessment: chest rise and CO2 detector  Breath sounds: equal and absent over the epigastrium  Cuff inflated: yes  ETT to lip: 23 cm  Tube secured with: ETT betts  Chest x-ray interpreted by me.  Chest x-ray findings: endotracheal tube in appropriate position  Patient tolerance: Patient tolerated the procedure well with no immediate complications  Complications: No  Specimens: No  Implants: No      Critical Care    Date/Time: 10/30/2022 10:31 AM  Performed by: Eddi Solano MD  Authorized by: Eddi Solano MD   Direct patient critical care time: 15 minutes  Additional history critical care time: 5 minutes  Ordering / reviewing critical care time: 10 minutes  Documentation critical care time: 5 minutes  Consulting other physicians critical care time: 5 minutes  Consult with family critical care time: 5 minutes  Total critical care time (exclusive of procedural time) : 45 minutes  Critical  care time was exclusive of teaching time and separately billable procedures and treating other patients.  Critical care was necessary to treat or prevent imminent or life-threatening deterioration of the following conditions: respiratory failure.  Critical care was time spent personally by me on the following activities: blood draw for specimens, development of treatment plan with patient or surrogate, discussions with consultants, interpretation of cardiac output measurements, evaluation of patient's response to treatment, examination of patient, obtaining history from patient or surrogate, ordering and performing treatments and interventions, ordering and review of laboratory studies, ordering and review of radiographic studies, re-evaluation of patient's condition, pulse oximetry, review of old charts, ventilator management and vascular access procedures.      Central Line    Date/Time: 10/30/2022 10:28 AM  Performed by: Eddi Solano MD  Authorized by: Eddi Solano MD     Location procedure was performed:  [facility] EMERGENCY DEPARTMENT  Consent Done ?:  Emergent Situation  Indications:  Med administration and vascular access  Preparation:  Skin prepped with ChloraPrep  Skin prep agent dried: Skin prep agent completely dried prior to procedure    Sterile barriers: All five maximal sterile barriers used - gloves, gown, cap, mask and large sterile sheet    Location:  Right internal jugular  Catheter type:  Triple lumen  Catheter size:  7 Fr  Inserted Catheter Length (cm):  16  Ultrasound guidance: Yes    Vessel Caliber:  Medium   patent  Comprressibility:  Normal  Needle advanced into vessel with real time ultrasound guidance.    Guidewire confirmed in vessel.    Steril sheath on probe.    Sterile gel used.  Manometry: No    Number of attempts:  1  Securement:  Line sutured, blood return through all ports and sterile dressing applied  Complications: No    Specimens: No    Implants: No    XRay:  Placement verified by  x-ray, successful placement and no pneumothorax on x-ray  Adverse Events:  NoneTermination Site: superior vena cava  ED Vital Signs:  Vitals:    10/30/22 0840 10/30/22 0846 10/30/22 0857 10/30/22 0912   BP: 130/78      Pulse: 79 79  71   Resp: (!) 27 20  (!) 30   Temp:       TempSrc:       SpO2: (!) 92% (!) 94%  (!) 94%   Weight:   127 kg (280 lb)     10/30/22 0939 10/30/22 1000 10/30/22 1049 10/30/22 1053   BP: (!) 155/98   138/88   Pulse: 72   72   Resp: (!) 30   (!) 30   Temp:  (!) 95.1 °F (35.1 °C) (!) 95 °F (35 °C)    TempSrc:  Rectal Rectal    SpO2: 96%   99%   Weight:        10/30/22 1134 10/30/22 1149 10/30/22 1252 10/30/22 1256   BP: (!) 147/76 103/63 100/65    Pulse:   78    Resp:   (!) 30    Temp:    97.6 °F (36.4 °C)   TempSrc:    Axillary   SpO2:   97%    Weight:        10/30/22 1300 10/30/22 1327   BP:     Pulse: 75 81   Resp: (!) 24    Temp:     TempSrc:     SpO2: 97%    Weight:         Abnormal Lab Results:  Labs Reviewed   CBC W/ AUTO DIFFERENTIAL - Abnormal; Notable for the following components:       Result Value    RBC 4.53 (*)     Hemoglobin 13.9 (*)      (*)     MCHC 29.8 (*)     RDW 15.6 (*)     Immature Granulocytes 0.8 (*)     Gran # (ANC) 7.8 (*)     Immature Grans (Abs) 0.08 (*)     Lymph # 0.7 (*)     Gran % 81.5 (*)     Lymph % 7.6 (*)     All other components within normal limits   COMPREHENSIVE METABOLIC PANEL - Abnormal; Notable for the following components:    CO2 35 (*)     Glucose 147 (*)     Albumin 3.1 (*)     All other components within normal limits   URINALYSIS, REFLEX TO URINE CULTURE - Abnormal; Notable for the following components:    Protein, UA 1+ (*)     Urobilinogen, UA 2.0-3.0 (*)     All other components within normal limits    Narrative:     Specimen Source->Urine   SARS-COV-2 RNA AMPLIFICATION, QUAL - Abnormal; Notable for the following components:    SARS-CoV-2 RNA, Amplification, Qual Positive (*)     All other components within normal limits   URINALYSIS  MICROSCOPIC - Abnormal; Notable for the following components:    Hyaline Casts, UA 3 (*)     All other components within normal limits    Narrative:     Specimen Source->Urine   ISTAT PROCEDURE - Abnormal; Notable for the following components:    POC PH 7.257 (*)     POC PCO2 98.5 (*)     POC PO2 71 (*)     POC HCO3 43.9 (*)     POC SATURATED O2 89 (*)     All other components within normal limits   ISTAT PROCEDURE - Abnormal; Notable for the following components:    POC PCO2 56.1 (*)     POC PO2 67 (*)     POC HCO3 35.4 (*)     POC SATURATED O2 93 (*)     All other components within normal limits   INFLUENZA A & B BY MOLECULAR   CULTURE, BLOOD   CULTURE, BLOOD   CULTURE, RESPIRATORY   B-TYPE NATRIURETIC PEPTIDE   TROPONIN I   LACTIC ACID, PLASMA   PROCALCITONIN   FERRITIN   C-REACTIVE PROTEIN   SEDIMENTATION RATE   PROCALCITONIN   TSH   CORTISOL, RANDOM   RESPIRATORY PATHOGENS PANEL (TEM-PCR)   POCT GLUCOSE MONITORING CONTINUOUS        All Lab Results:  Results for orders placed or performed during the hospital encounter of 10/30/22   Influenza A & B by Molecular    Specimen: Nasopharyngeal Swab   Result Value Ref Range    Influenza A, Molecular Negative Negative    Influenza B, Molecular Negative Negative    Flu A & B Source Nasal swab    CBC auto differential   Result Value Ref Range    WBC 9.53 3.90 - 12.70 K/uL    RBC 4.53 (L) 4.60 - 6.20 M/uL    Hemoglobin 13.9 (L) 14.0 - 18.0 g/dL    Hematocrit 46.7 40.0 - 54.0 %     (H) 82 - 98 fL    MCH 30.7 27.0 - 31.0 pg    MCHC 29.8 (L) 32.0 - 36.0 g/dL    RDW 15.6 (H) 11.5 - 14.5 %    Platelets 206 150 - 450 K/uL    MPV 9.3 9.2 - 12.9 fL    Immature Granulocytes 0.8 (H) 0.0 - 0.5 %    Gran # (ANC) 7.8 (H) 1.8 - 7.7 K/uL    Immature Grans (Abs) 0.08 (H) 0.00 - 0.04 K/uL    Lymph # 0.7 (L) 1.0 - 4.8 K/uL    Mono # 0.8 0.3 - 1.0 K/uL    Eos # 0.1 0.0 - 0.5 K/uL    Baso # 0.03 0.00 - 0.20 K/uL    nRBC 0 0 /100 WBC    Gran % 81.5 (H) 38.0 - 73.0 %    Lymph % 7.6 (L)  18.0 - 48.0 %    Mono % 8.6 4.0 - 15.0 %    Eosinophil % 1.2 0.0 - 8.0 %    Basophil % 0.3 0.0 - 1.9 %    Differential Method Automated    Comprehensive metabolic panel   Result Value Ref Range    Sodium 144 136 - 145 mmol/L    Potassium 4.7 3.5 - 5.1 mmol/L    Chloride 99 95 - 110 mmol/L    CO2 35 (H) 23 - 29 mmol/L    Glucose 147 (H) 70 - 110 mg/dL    BUN 16 8 - 23 mg/dL    Creatinine 0.7 0.5 - 1.4 mg/dL    Calcium 9.0 8.7 - 10.5 mg/dL    Total Protein 7.6 6.0 - 8.4 g/dL    Albumin 3.1 (L) 3.5 - 5.2 g/dL    Total Bilirubin 0.3 0.1 - 1.0 mg/dL    Alkaline Phosphatase 74 55 - 135 U/L    AST 16 10 - 40 U/L    ALT 14 10 - 44 U/L    Anion Gap 10 8 - 16 mmol/L    eGFR >60 >60 mL/min/1.73 m^2   Brain natriuretic peptide   Result Value Ref Range    BNP 34 0 - 99 pg/mL   Troponin I   Result Value Ref Range    Troponin I <0.006 0.000 - 0.026 ng/mL   Lactic acid, plasma   Result Value Ref Range    Lactate (Lactic Acid) 0.7 0.5 - 2.2 mmol/L   Procalcitonin   Result Value Ref Range    Procalcitonin 0.02 <0.25 ng/mL   Urinalysis, Reflex to Urine Culture Urine, Catheterized    Specimen: Urine   Result Value Ref Range    Specimen UA Urine, Catheterized     Color, UA Yellow Yellow, Straw, Neelam    Appearance, UA Clear Clear    pH, UA 6.0 5.0 - 8.0    Specific Gravity, UA 1.020 1.005 - 1.030    Protein, UA 1+ (A) Negative    Glucose, UA Negative Negative    Ketones, UA Negative Negative    Bilirubin (UA) Negative Negative    Occult Blood UA Negative Negative    Nitrite, UA Negative Negative    Urobilinogen, UA 2.0-3.0 (A) <2.0 EU/dL    Leukocytes, UA Negative Negative   COVID-19 Rapid Screening   Result Value Ref Range    SARS-CoV-2 RNA, Amplification, Qual Positive (A) Negative   Urinalysis Microscopic   Result Value Ref Range    RBC, UA 0 0 - 4 /hpf    WBC, UA 1 0 - 5 /hpf    Bacteria None None-Occ /hpf    Hyaline Casts, UA 3 (A) 0-1/lpf /lpf    Microscopic Comment MUCUS PRESENT    ISTAT PROCEDURE   Result Value Ref Range    POC  PH 7.257 (LL) 7.35 - 7.45    POC PCO2 98.5 (HH) 35 - 45 mmHg    POC PO2 71 (L) 80 - 100 mmHg    POC HCO3 43.9 (H) 24 - 28 mmol/L    POC BE 17 -2 to 2 mmol/L    POC SATURATED O2 89 (L) 95 - 100 %    Rate 20     Sample ARTERIAL     Site RR     Allens Test Pass     DelSys Adult Vent     Mode AC/PRVC     Vt 450     PEEP 8     FiO2 100    ISTAT PROCEDURE   Result Value Ref Range    POC PH 7.408 7.35 - 7.45    POC PCO2 56.1 (HH) 35 - 45 mmHg    POC PO2 67 (L) 80 - 100 mmHg    POC HCO3 35.4 (H) 24 - 28 mmol/L    POC BE 11 -2 to 2 mmol/L    POC SATURATED O2 93 (L) 95 - 100 %    Rate 30     Sample ARTERIAL     Site RR     Allens Test Pass     DelSys Adult Vent     Mode AC/PRVC     Vt 470     PEEP 10     FiO2 100          Imaging Results:  Imaging Results              CTA Chest Non-Coronary (PE Studies) (In process)                      X-Ray Chest 1 View (Final result)  Result time 10/30/22 11:06:21      Final result by Raheem Hendrickson MD (10/30/22 11:06:21)                   Impression:      See above      Electronically signed by: Raheem Hendrickson MD  Date:    10/30/2022  Time:    11:06               Narrative:    EXAMINATION:  XR CHEST 1 VIEW    CLINICAL HISTORY:  Central line plcmnt;    COMPARISON:  Chest x-ray performed less than 2 hours ago    FINDINGS:  Endotracheal tube in good position.  NG tube tip in the fundus of the stomach.  Newly placed right IJ central venous catheter tip in the SVC.  No pneumothorax.  Right middle lobe and right lower lobe total atelectasis.  Marked left basilar atelectasis.                                       CT Head Without Contrast (Final result)  Result time 10/30/22 09:52:01      Final result by Raheem Hendrickson MD (10/30/22 09:52:01)                   Impression:      1. No acute intracranial process.  2. 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  3. Fluid filling the right middle ear cavity which can be seen with otitis  media.  4. Paranasal sinus disease.  All CT scans at this facility are performed  using dose modulation techniques as appropriate to performed exam including the following:  automated exposure control; adjustment of mA and/or kV according to the patients size (this includes techniques or standardized protocols for targeted exams where dose is matched to indication/reason for exam: i.e. extremities or head);  iterative reconstruction technique.      Electronically signed by: Raheem Hendrickson MD  Date:    10/30/2022  Time:    09:52               Narrative:    EXAMINATION:  CT HEAD WITHOUT CONTRAST    CLINICAL HISTORY:  Mental status change, unknown cause;    TECHNIQUE:  Axial CT imaging was performed through the head without intravenous contrast.    COMPARISON:  None    FINDINGS:  No hydrocephalus, midline shift, mass effect, or acute intracranial hemorrhage. Chronic small vessel ischemic change of the white matter.  Old lacunar infarcts in the left caudate nucleus head and body.  Mucosal thickening of the paranasal sinuses.  Fluid filling the right middle ear cavity..  The skull is intact.                                       X-Ray Chest AP Portable (Final result)  Result time 10/30/22 09:26:02      Final result by Raheem Hendrickson MD (10/30/22 09:26:02)                   Impression:      See above      Electronically signed by: Raheem Hendrickson MD  Date:    10/30/2022  Time:    09:26               Narrative:    EXAMINATION:  XR CHEST AP PORTABLE    CLINICAL HISTORY:  Presence of other specified devices    COMPARISON:  None.    FINDINGS:  Endotracheal tube in good position.  The NG tube courses down the esophagus with its tip in the fundus of the stomach.  There are markedly elevated hemidiaphragms with marked low lung volumes.  Right middle lobe and right lower lobe total atelectasis with a cut off sign of the right mainstem bronchus.  Marked left basilar atelectasis.  No pneumothorax.                                        The EKG was ordered, reviewed, and independently interpreted by the ED provider.  Interpretation time: 08:50:43  Rate: 82 BPM  Rhythm: normal sinus rhythm  Interpretation: No acute ST changes. No STEMI.             The Emergency Provider reviewed the vital signs and test results, which are outlined above.     ED Discussion       10:38 AM: Discussed case with ETIENNE Hayes ([facility]). Dr. Arevalo agrees with current care and management of pt and accepts admission.   Admitting Service: [facility]  Admitting Physician: Dr. Arevalo  Admit to: ICU         Medical Decision Making:   Clinical Tests:   Lab Tests: Reviewed and Ordered  Radiological Study: Ordered and Reviewed  Medical Tests: Ordered and Reviewed  Patient presented to the emergency department in significant distress.  GCS 6 and hypoxic.  He presented being bagged by [facility].  Patient was intubated immediately on arrival.  Patient found to be COVID positive.  CT head within normal limits.  Patient admitted to the ICU.  CTA ordered to evaluate for possible pulmonary embolism.  CTA pending at the time of hand off to [facility].  Critical care was consulted as well         ED Medication(s):  Medications   propofol (DIPRIVAN) 10 mg/mL infusion (20 mcg/kg/min × 127 kg Intravenous Rate/Dose Change 10/30/22 1119)   NORepinephrine 4 mg in dextrose 5% 250 mL infusion (premix) (titrating) (0 mcg/kg/min × 127 kg Intravenous Hold 10/30/22 1215)   0.9%  NaCl infusion ( Intravenous New Bag 10/30/22 1230)   sodium chloride 0.9% flush 10 mL (has no administration in time range)   potassium chloride 40 mEq in 100 mL IVPB (FOR CENTRAL LINE ADMINISTRATION ONLY) (has no administration in time range)     And   potassium chloride 20 mEq in 100 mL IVPB (FOR CENTRAL LINE ADMINISTRATION ONLY) (has no administration in time range)   magnesium sulfate 2g in water 50mL IVPB (premix) (has no administration in time range)   magnesium sulfate 2g in  water 50mL IVPB (premix) (has no administration in time range)   sodium phosphate 15 mmol in dextrose 5 % 250 mL IVPB (has no administration in time range)   sodium phosphate 20.01 mmol in dextrose 5 % 250 mL IVPB (has no administration in time range)   sodium phosphate 30 mmol in dextrose 5 % 250 mL IVPB (has no administration in time range)   calcium gluconate 1 g in NS IVPB (premixed) (has no administration in time range)   calcium gluconate 1 g in NS IVPB (premixed) (has no administration in time range)   calcium gluconate 1 g in NS IVPB (premixed) (has no administration in time range)   acetaminophen tablet 650 mg (has no administration in time range)   morphine injection 4 mg (has no administration in time range)   famotidine (PF) injection 20 mg (has no administration in time range)   ondansetron injection 4 mg (has no administration in time range)   mupirocin 2 % ointment (has no administration in time range)   chlorhexidine 0.12 % solution 15 mL (has no administration in time range)   fentaNYL 2500 mcg in 0.9% sodium chloride 250 mL infusion premix (titrating) (25 mcg/hr Intravenous New Bag 10/30/22 1237)   propofol (DIPRIVAN) 10 mg/mL infusion (has no administration in time range)   albuterol inhaler 2 puff (has no administration in time range)   glucagon (human recombinant) injection 1 mg (has no administration in time range)   dextrose 10% bolus 125 mL (has no administration in time range)   dextrose 10% bolus 250 mL (has no administration in time range)   insulin aspart U-100 pen 1-10 Units (has no administration in time range)   cefepime in dextrose 5 % IVPB 2 g (2 g Intravenous New Bag 10/30/22 1233)   etomidate injection 20 mg (20 mg Intravenous Given 10/30/22 0841)   rocuronium injection 100 mg (100 mg Intravenous Given 10/30/22 0842)   dexAMETHasone injection 6 mg (6 mg Intravenous Given 10/30/22 1133)   iohexoL (OMNIPAQUE 350) injection 100 mL (100 mLs Intravenous Given 10/30/22 1323)       New  Prescriptions    No medications on file           Scribe Attestation:   Scribe #1: I performed the above scribed service and the documentation accurately describes the services I performed. I attest to the accuracy of the note.     Attending:   Physician Attestation Statement for Scribe #1: I, Eddi Solano MD, personally performed the services described in this documentation, as scribed by Terry Sherman, in my presence, and it is both accurate and complete.           Clinical Impression       ICD-10-CM ICD-9-CM   1. COVID  U07.1 079.89   2. AMS (altered mental status)  R41.82 780.97   3. Endotracheally intubated  Z97.8 V49.89   4. Acute hypoxemic respiratory failure  J96.01 518.81   5. Acute respiratory failure with hypoxia  J96.01 518.81   6. Acute anoxic encephalopathy  G93.1 348.1       Disposition:   Disposition: Admitted  Condition: Critical       Eddi Solano MD  10/30/22 0550

## 2022-10-30 NOTE — HPI
Patient was intubated and sedated at the time of examination, got most of information from documentation    Brady Haines is a 72 y.o. male patient with a PMHx of Hypertension and Sepsis due to methicillin susceptible Staphylococcus aureus (MSSA) with acute hypercapnic respiratory failure without septic shock (HCC) (5/2/2022) who presents to the Emergency Department for evaluation of unresponsiveness. Pt was found unresponsive at a nursing home this morning. Per EMS, pt was found with agonal breathing and O2 sat of 40%. Pt was found with blood glucose of 100. EMS tried to intubate en route but was unsuccessful. EMS was also unsuccessful at starting an IV. Per EMS, pt responds to painful stimuli. Symptoms are constant and moderate in severity. HPI and ROS limited due to pt being unresponsive.    Patient is a assisted resident of [facility] and requires 24/7 care and support for all activities. As per wife- patient has a history of prolonged  hospitalization at [facility] in May 2022: OLOl due Spine Abscess: (Staph and acinetobacter)  Unable to participate in physical therapy due to knee joint issues ;

## 2022-10-30 NOTE — ASSESSMENT & PLAN NOTE
Patient is vaccinated    COVID risk score 4     REMDESIVIR  DEXAMETHASONE  ZINC  Vit C  resp path PCR

## 2022-10-30 NOTE — ASSESSMENT & PLAN NOTE
Pulmonary:  Continue ventilator support.            Ventilator Data (Last 24H):     Vent Mode: A/C  Oxygen Concentration (%):  [100] 100  Resp Rate Total:  [0 br/min-30 br/min] 30 br/min  Vt Set:  [450 mL-470 mL] 470 mL  PEEP/CPAP:  [8 cmH20-10 cmH20] 10 cmH20  Pressure Support:  [0 cmH20] 0 cmH20  Mean Airway Pressure:  [0 iwG95-10 cmH20] 19 cmH20      Ventilator settings reviewed and adjusted to optimize gas exchange.      Proceed with weaning trials on a support/weaning mode on the ventilator when the following goals are met:  Patient Criteria:  - Spontaneous breathing in CPAP  - Able to lift head off pillow  - Hemodynamic stability  - HR: 60 to 110 bpm  - ABP: greater than 90/50  - RR: 10 to 28 bpm  - Secretion management/adequate cough  - Acceptable arterial blood gases     Ventilator Criteria:  - FiO2: less than or equal to 40%  - PEEP less than or equal to 8 to 10 cm H2O     Bronchodilators per protocol.    Sedation: Fentanyl + Propofol    Serial ABG    AM CXR    Tube feeds    Miralax, Colace    Sliding scale    On ELIQUIS

## 2022-10-31 PROBLEM — U07.1 COVID: Status: ACTIVE | Noted: 2022-10-31

## 2022-10-31 PROBLEM — J96.01 ACUTE HYPOXEMIC RESPIRATORY FAILURE: Status: ACTIVE | Noted: 2022-10-31

## 2022-10-31 LAB
ALLENS TEST: ABNORMAL
ANION GAP SERPL CALC-SCNC: 7 MMOL/L (ref 8–16)
APPEARANCE FLD: NORMAL
BACTERIA SPEC AEROBE CULT: NORMAL
BASOPHILS # BLD AUTO: 0.02 K/UL (ref 0–0.2)
BASOPHILS NFR BLD: 0.1 % (ref 0–1.9)
BODY FLD TYPE: NORMAL
BUN SERPL-MCNC: 20 MG/DL (ref 8–23)
CALCIUM SERPL-MCNC: 8.1 MG/DL (ref 8.7–10.5)
CHLORIDE SERPL-SCNC: 103 MMOL/L (ref 95–110)
CO2 SERPL-SCNC: 29 MMOL/L (ref 23–29)
COLOR FLD: NORMAL
CORTIS SERPL-MCNC: 10.8 UG/DL
CREAT SERPL-MCNC: 0.7 MG/DL (ref 0.5–1.4)
DELSYS: ABNORMAL
DIFFERENTIAL METHOD: ABNORMAL
EOSINOPHIL # BLD AUTO: 0 K/UL (ref 0–0.5)
EOSINOPHIL NFR BLD: 0.1 % (ref 0–8)
ERYTHROCYTE [DISTWIDTH] IN BLOOD BY AUTOMATED COUNT: 16 % (ref 11.5–14.5)
ERYTHROCYTE [SEDIMENTATION RATE] IN BLOOD BY WESTERGREN METHOD: 30 MM/H
EST. GFR  (NO RACE VARIABLE): >60 ML/MIN/1.73 M^2
FIO2: 90
GLUCOSE SERPL-MCNC: 140 MG/DL (ref 70–110)
GRAM STN SPEC: NORMAL
GRAM STN SPEC: NORMAL
HCO3 UR-SCNC: 31.5 MMOL/L (ref 24–28)
HCT VFR BLD AUTO: 39.4 % (ref 40–54)
HGB BLD-MCNC: 12.7 G/DL (ref 14–18)
IMM GRANULOCYTES # BLD AUTO: 0.07 K/UL (ref 0–0.04)
IMM GRANULOCYTES NFR BLD AUTO: 0.5 % (ref 0–0.5)
LYMPHOCYTES # BLD AUTO: 2.1 K/UL (ref 1–4.8)
LYMPHOCYTES NFR BLD: 14.6 % (ref 18–48)
MAGNESIUM SERPL-MCNC: 1.7 MG/DL (ref 1.6–2.6)
MCH RBC QN AUTO: 30.8 PG (ref 27–31)
MCHC RBC AUTO-ENTMCNC: 32.2 G/DL (ref 32–36)
MCV RBC AUTO: 96 FL (ref 82–98)
MODE: ABNORMAL
MONOCYTES # BLD AUTO: 1.2 K/UL (ref 0.3–1)
MONOCYTES NFR BLD: 8.3 % (ref 4–15)
MONOS+MACROS NFR FLD MANUAL: 62 %
NEUTROPHILS # BLD AUTO: 10.8 K/UL (ref 1.8–7.7)
NEUTROPHILS NFR BLD: 76.4 % (ref 38–73)
NEUTROPHILS NFR FLD MANUAL: 38 %
NRBC BLD-RTO: 0 /100 WBC
PCO2 BLDA: 45.2 MMHG (ref 35–45)
PEEP: 10
PH SMN: 7.45 [PH] (ref 7.35–7.45)
PLATELET # BLD AUTO: 241 K/UL (ref 150–450)
PMV BLD AUTO: 9.8 FL (ref 9.2–12.9)
PO2 BLDA: 99 MMHG (ref 80–100)
POC BE: 8 MMOL/L
POC SATURATED O2: 98 % (ref 95–100)
POCT GLUCOSE: 110 MG/DL (ref 70–110)
POCT GLUCOSE: 116 MG/DL (ref 70–110)
POCT GLUCOSE: 128 MG/DL (ref 70–110)
POCT GLUCOSE: 141 MG/DL (ref 70–110)
POTASSIUM SERPL-SCNC: 4 MMOL/L (ref 3.5–5.1)
PROCALCITONIN SERPL IA-MCNC: 0.05 NG/ML
RBC # BLD AUTO: 4.12 M/UL (ref 4.6–6.2)
SAMPLE: ABNORMAL
SITE: ABNORMAL
SODIUM SERPL-SCNC: 139 MMOL/L (ref 136–145)
VT: 470
WBC # BLD AUTO: 14.14 K/UL (ref 3.9–12.7)
WBC # FLD: 5445 /CU MM

## 2022-10-31 PROCEDURE — 88112 CYTOPATH CELL ENHANCE TECH: CPT | Mod: 26,,, | Performed by: PATHOLOGY

## 2022-10-31 PROCEDURE — 25000003 PHARM REV CODE 250: Performed by: STUDENT IN AN ORGANIZED HEALTH CARE EDUCATION/TRAINING PROGRAM

## 2022-10-31 PROCEDURE — 85025 COMPLETE CBC W/AUTO DIFF WBC: CPT | Performed by: INTERNAL MEDICINE

## 2022-10-31 PROCEDURE — 31622 DX BRONCHOSCOPE/WASH: CPT

## 2022-10-31 PROCEDURE — 89051 BODY FLUID CELL COUNT: CPT | Performed by: INTERNAL MEDICINE

## 2022-10-31 PROCEDURE — 87102 FUNGUS ISOLATION CULTURE: CPT | Performed by: INTERNAL MEDICINE

## 2022-10-31 PROCEDURE — 25000003 PHARM REV CODE 250: Performed by: INTERNAL MEDICINE

## 2022-10-31 PROCEDURE — 63600175 PHARM REV CODE 636 W HCPCS

## 2022-10-31 PROCEDURE — 87106 FUNGI IDENTIFICATION YEAST: CPT | Performed by: INTERNAL MEDICINE

## 2022-10-31 PROCEDURE — 99291 PR CRITICAL CARE, E/M 30-74 MINUTES: ICD-10-PCS | Mod: 25,,, | Performed by: INTERNAL MEDICINE

## 2022-10-31 PROCEDURE — 94003 VENT MGMT INPAT SUBQ DAY: CPT

## 2022-10-31 PROCEDURE — 88305 TISSUE EXAM BY PATHOLOGIST: ICD-10-PCS | Mod: 26,,, | Performed by: PATHOLOGY

## 2022-10-31 PROCEDURE — 88305 TISSUE EXAM BY PATHOLOGIST: CPT | Performed by: PATHOLOGY

## 2022-10-31 PROCEDURE — 63600175 PHARM REV CODE 636 W HCPCS: Performed by: STUDENT IN AN ORGANIZED HEALTH CARE EDUCATION/TRAINING PROGRAM

## 2022-10-31 PROCEDURE — 88112 CYTOPATH CELL ENHANCE TECH: CPT | Performed by: PATHOLOGY

## 2022-10-31 PROCEDURE — 25000242 PHARM REV CODE 250 ALT 637 W/ HCPCS: Performed by: STUDENT IN AN ORGANIZED HEALTH CARE EDUCATION/TRAINING PROGRAM

## 2022-10-31 PROCEDURE — 94640 AIRWAY INHALATION TREATMENT: CPT

## 2022-10-31 PROCEDURE — 83735 ASSAY OF MAGNESIUM: CPT | Performed by: INTERNAL MEDICINE

## 2022-10-31 PROCEDURE — 80048 BASIC METABOLIC PNL TOTAL CA: CPT | Performed by: INTERNAL MEDICINE

## 2022-10-31 PROCEDURE — 37799 UNLISTED PX VASCULAR SURGERY: CPT

## 2022-10-31 PROCEDURE — 20000000 HC ICU ROOM

## 2022-10-31 PROCEDURE — 87070 CULTURE OTHR SPECIMN AEROBIC: CPT | Performed by: INTERNAL MEDICINE

## 2022-10-31 PROCEDURE — 88305 TISSUE EXAM BY PATHOLOGIST: CPT | Mod: 26,,, | Performed by: PATHOLOGY

## 2022-10-31 PROCEDURE — 94761 N-INVAS EAR/PLS OXIMETRY MLT: CPT

## 2022-10-31 PROCEDURE — 31645 PR BRONCHOSCOPY,RX ASPIR PULM TREE: ICD-10-PCS | Mod: ,,, | Performed by: INTERNAL MEDICINE

## 2022-10-31 PROCEDURE — 88112 PR  CYTOPATH, CELL ENHANCE TECH: ICD-10-PCS | Mod: 26,,, | Performed by: PATHOLOGY

## 2022-10-31 PROCEDURE — 25000242 PHARM REV CODE 250 ALT 637 W/ HCPCS: Performed by: INTERNAL MEDICINE

## 2022-10-31 PROCEDURE — 82803 BLOOD GASES ANY COMBINATION: CPT

## 2022-10-31 PROCEDURE — 27000221 HC OXYGEN, UP TO 24 HOURS

## 2022-10-31 PROCEDURE — 99900026 HC AIRWAY MAINTENANCE (STAT)

## 2022-10-31 PROCEDURE — 84145 PROCALCITONIN (PCT): CPT | Performed by: INTERNAL MEDICINE

## 2022-10-31 PROCEDURE — 31645 BRNCHSC W/THER ASPIR 1ST: CPT | Mod: ,,, | Performed by: INTERNAL MEDICINE

## 2022-10-31 PROCEDURE — 99291 CRITICAL CARE FIRST HOUR: CPT | Mod: 25,,, | Performed by: INTERNAL MEDICINE

## 2022-10-31 PROCEDURE — 94668 MNPJ CHEST WALL SBSQ: CPT

## 2022-10-31 PROCEDURE — 87205 SMEAR GRAM STAIN: CPT | Performed by: INTERNAL MEDICINE

## 2022-10-31 PROCEDURE — 99900035 HC TECH TIME PER 15 MIN (STAT)

## 2022-10-31 PROCEDURE — 27000207 HC ISOLATION

## 2022-10-31 PROCEDURE — 63600175 PHARM REV CODE 636 W HCPCS: Mod: TB | Performed by: INTERNAL MEDICINE

## 2022-10-31 PROCEDURE — 99900025 HC BRONCHOSCOPY-ASST (STAT)

## 2022-10-31 RX ORDER — POLYETHYLENE GLYCOL 3350 17 G/17G
17 POWDER, FOR SOLUTION ORAL DAILY
Status: DISCONTINUED | OUTPATIENT
Start: 2022-10-31 | End: 2022-11-02

## 2022-10-31 RX ORDER — FAMOTIDINE 40 MG/5ML
20 POWDER, FOR SUSPENSION ORAL 2 TIMES DAILY
Status: DISCONTINUED | OUTPATIENT
Start: 2022-10-31 | End: 2022-11-02 | Stop reason: SDUPTHER

## 2022-10-31 RX ORDER — DOCUSATE SODIUM 50 MG/5ML
100 LIQUID ORAL DAILY
Status: DISCONTINUED | OUTPATIENT
Start: 2022-10-31 | End: 2022-11-02 | Stop reason: SDUPTHER

## 2022-10-31 RX ORDER — ACETAMINOPHEN 650 MG/20.3ML
650 LIQUID ORAL EVERY 4 HOURS PRN
Status: DISCONTINUED | OUTPATIENT
Start: 2022-10-31 | End: 2022-11-02 | Stop reason: SDUPTHER

## 2022-10-31 RX ORDER — CEFEPIME HYDROCHLORIDE 1 G/50ML
2 INJECTION, SOLUTION INTRAVENOUS
Status: DISCONTINUED | OUTPATIENT
Start: 2022-10-31 | End: 2022-11-03 | Stop reason: HOSPADM

## 2022-10-31 RX ORDER — ASCORBIC ACID 500 MG
1000 TABLET ORAL DAILY
Status: DISCONTINUED | OUTPATIENT
Start: 2022-10-31 | End: 2022-11-02

## 2022-10-31 RX ORDER — VECURONIUM BROMIDE FOR INJECTION 1 MG/ML
10 INJECTION, POWDER, LYOPHILIZED, FOR SOLUTION INTRAVENOUS ONCE
Status: COMPLETED | OUTPATIENT
Start: 2022-10-31 | End: 2022-10-31

## 2022-10-31 RX ORDER — METRONIDAZOLE 500 MG/1
500 TABLET ORAL EVERY 8 HOURS
Status: DISCONTINUED | OUTPATIENT
Start: 2022-10-31 | End: 2022-10-31

## 2022-10-31 RX ORDER — ZINC SULFATE 50(220)MG
220 CAPSULE ORAL DAILY
Status: DISCONTINUED | OUTPATIENT
Start: 2022-10-31 | End: 2022-11-02

## 2022-10-31 RX ORDER — MIDAZOLAM HYDROCHLORIDE 1 MG/ML
INJECTION INTRAMUSCULAR; INTRAVENOUS
Status: COMPLETED
Start: 2022-10-31 | End: 2022-10-31

## 2022-10-31 RX ORDER — MIDAZOLAM HYDROCHLORIDE 1 MG/ML
2 INJECTION INTRAMUSCULAR; INTRAVENOUS ONCE
Status: COMPLETED | OUTPATIENT
Start: 2022-10-31 | End: 2022-10-31

## 2022-10-31 RX ADMIN — APIXABAN 5 MG: 2.5 TABLET, FILM COATED ORAL at 08:10

## 2022-10-31 RX ADMIN — OXYCODONE HYDROCHLORIDE AND ACETAMINOPHEN 1000 MG: 500 TABLET ORAL at 09:10

## 2022-10-31 RX ADMIN — PROPOFOL 35 MCG/KG/MIN: 10 INJECTION, EMULSION INTRAVENOUS at 10:10

## 2022-10-31 RX ADMIN — FAMOTIDINE 20 MG: 10 INJECTION INTRAVENOUS at 09:10

## 2022-10-31 RX ADMIN — MIDAZOLAM 2 MG: 1 INJECTION INTRAMUSCULAR; INTRAVENOUS at 11:10

## 2022-10-31 RX ADMIN — CEFEPIME 2 G: 2 INJECTION, POWDER, FOR SOLUTION INTRAVENOUS at 10:10

## 2022-10-31 RX ADMIN — CEFEPIME HYDROCHLORIDE 1 G: 1 INJECTION, SOLUTION INTRAVENOUS at 03:10

## 2022-10-31 RX ADMIN — SODIUM CHLORIDE 30 MG/ML INHALATION SOLUTION 4 ML: 30 SOLUTION INHALANT at 07:10

## 2022-10-31 RX ADMIN — VECURONIUM BROMIDE 10 MG: 10 INJECTION, POWDER, LYOPHILIZED, FOR SOLUTION INTRAVENOUS at 11:10

## 2022-10-31 RX ADMIN — ZINC SULFATE 220 MG (50 MG) CAPSULE 220 MG: CAPSULE at 09:10

## 2022-10-31 RX ADMIN — MUPIROCIN: 20 OINTMENT TOPICAL at 09:10

## 2022-10-31 RX ADMIN — ACETYLCYSTEINE 4 ML: 100 INHALANT RESPIRATORY (INHALATION) at 03:10

## 2022-10-31 RX ADMIN — PROPOFOL 30 MCG/KG/MIN: 10 INJECTION, EMULSION INTRAVENOUS at 07:10

## 2022-10-31 RX ADMIN — CEFEPIME 2 G: 2 INJECTION, POWDER, FOR SOLUTION INTRAVENOUS at 06:10

## 2022-10-31 RX ADMIN — Medication 0.02 MCG/KG/MIN: at 09:10

## 2022-10-31 RX ADMIN — CHLORHEXIDINE GLUCONATE 0.12% ORAL RINSE 15 ML: 1.2 LIQUID ORAL at 09:10

## 2022-10-31 RX ADMIN — POLYETHYLENE GLYCOL 3350 17 G: 17 POWDER, FOR SOLUTION ORAL at 09:10

## 2022-10-31 RX ADMIN — SODIUM CHLORIDE 30 MG/ML INHALATION SOLUTION 4 ML: 30 SOLUTION INHALANT at 03:10

## 2022-10-31 RX ADMIN — MIDAZOLAM HYDROCHLORIDE 2 MG: 1 INJECTION INTRAMUSCULAR; INTRAVENOUS at 11:10

## 2022-10-31 RX ADMIN — PROPOFOL 35 MCG/KG/MIN: 10 INJECTION, EMULSION INTRAVENOUS at 12:10

## 2022-10-31 RX ADMIN — SODIUM CHLORIDE 30 MG/ML INHALATION SOLUTION 4 ML: 30 SOLUTION INHALANT at 11:10

## 2022-10-31 RX ADMIN — FAMOTIDINE 20 MG: 40 POWDER, FOR SUSPENSION ORAL at 08:10

## 2022-10-31 RX ADMIN — ALBUTEROL SULFATE 2.5 MG: 2.5 SOLUTION RESPIRATORY (INHALATION) at 03:10

## 2022-10-31 RX ADMIN — PROPOFOL 35 MCG/KG/MIN: 10 INJECTION, EMULSION INTRAVENOUS at 03:10

## 2022-10-31 RX ADMIN — MUPIROCIN: 20 OINTMENT TOPICAL at 08:10

## 2022-10-31 RX ADMIN — DEXAMETHASONE 6 MG: 4 TABLET ORAL at 09:10

## 2022-10-31 RX ADMIN — ACETYLCYSTEINE 4 ML: 100 INHALANT RESPIRATORY (INHALATION) at 11:10

## 2022-10-31 RX ADMIN — SODIUM CHLORIDE: 9 INJECTION, SOLUTION INTRAVENOUS at 02:10

## 2022-10-31 RX ADMIN — DOCUSATE SODIUM 100 MG: 50 LIQUID ORAL at 09:10

## 2022-10-31 RX ADMIN — MAGNESIUM SULFATE HEPTAHYDRATE 2 G: 40 INJECTION, SOLUTION INTRAVENOUS at 07:10

## 2022-10-31 RX ADMIN — METRONIDAZOLE 500 MG: 500 TABLET ORAL at 05:10

## 2022-10-31 RX ADMIN — PROPOFOL 35 MCG/KG/MIN: 10 INJECTION, EMULSION INTRAVENOUS at 06:10

## 2022-10-31 RX ADMIN — ALBUTEROL SULFATE 2.5 MG: 2.5 SOLUTION RESPIRATORY (INHALATION) at 11:10

## 2022-10-31 RX ADMIN — PROPOFOL 30 MCG/KG/MIN: 10 INJECTION, EMULSION INTRAVENOUS at 02:10

## 2022-10-31 RX ADMIN — ALBUTEROL SULFATE 2.5 MG: 2.5 SOLUTION RESPIRATORY (INHALATION) at 07:10

## 2022-10-31 RX ADMIN — CHLORHEXIDINE GLUCONATE 0.12% ORAL RINSE 15 ML: 1.2 LIQUID ORAL at 08:10

## 2022-10-31 RX ADMIN — ACETYLCYSTEINE 4 ML: 100 INHALANT RESPIRATORY (INHALATION) at 07:10

## 2022-10-31 RX ADMIN — APIXABAN 5 MG: 2.5 TABLET, FILM COATED ORAL at 10:10

## 2022-10-31 RX ADMIN — REMDESIVIR 100 MG: 100 INJECTION, POWDER, LYOPHILIZED, FOR SOLUTION INTRAVENOUS at 09:10

## 2022-10-31 NOTE — PROGRESS NOTES
Pharmacist Renal Dose Adjustment Note    Brady Haines is a 72 y.o. male being treated with the medication cefepime for pneumonia.     Patient Data:    Vital Signs (Most Recent):  Temp: 98 °F (36.7 °C) (10/31/22 0500)  Pulse: 82 (10/31/22 0903)  Resp: (!) 30 (10/31/22 0903)  BP: (!) 84/55 (10/31/22 0903)  SpO2: 95 % (10/31/22 0903)   Vital Signs (72h Range):  Temp:  [95 °F (35 °C)-99.7 °F (37.6 °C)]   Pulse:  []   Resp:  [12-79]   BP: ()/(37-98)   SpO2:  [78 %-100 %]   Arterial Line BP: ()/(56-81)      Recent Labs   Lab 10/30/22  0907 10/31/22  0450   CREATININE 0.7 0.7     Serum creatinine: 0.7 mg/dL 10/31/22 0450  Estimated creatinine clearance: 136.9 mL/min    Per protocol for a severe infection & for CrCl > 60 ml/min, dose will be increased from 1 gm IV every 8 hours to 2 gm IV every 8 hours.     Pharmacist's Name: Katherine E Mcardle  Pharmacist's Extension: [phone]

## 2022-10-31 NOTE — PROCEDURES
Bronchoscopy   Procedure Note     SUMMARY      Date of Procedure: 10/31/2022      Procedure: Bronchoscopy, therapeutic with bonchial washing     Provider: Serjio Licona MD     Assisting Provider: Airam Renae RN for patient monitoring and RT Chandrika     Pre-Procedure Diagnosis:  Atelectasis, acute respiratory failure with hypoxia     Post-Procedure Diagnosis:  Atelectasis, acute respiratory failure with hypoxia     Indication:  Clear mucus plugs to improve atelectasis     Anesthesia:   Patient was already in the ICU intubated and sedated on propofol and fentanyl drips.  He wasa given an additional versed 2mg IV x1 and vecuronium 10mg IV x1.     Technical Procedures Used:  Ambu disposable scope     Description of the Findings of the Procedure:      Patient's representative (wife, Aubree Haines) was consented for the procedure with all risks (including respiratory and hemodynamic collapse or other unforeseen complications) and benefits of the procedure explained in detail.  Patient was given the opportunity to ask questions and all concerns were answered.  The patient was already connected to the vent via ETT prior to the procedure.    The bronchocope was inserted into the ETT via an adapter and navigated through the trachea to the main jhony.  The scope was navigated to the right lower lobe were a mucus plug was found and cleared.  Bronchial washings performed in the right lower lobe with a total of 50 cc of saline instilled and subsequently suctioned.  This was sent to the lab forcultures, cytology.  Other areas of scattered mucus were also suctioned in the airways of the right lung, predominantly in the right lower lobe.      The scope was then navigated back to the main jhony and into the airways of the left lung.  No obvious mucus plugs or found.  However, several areas of scattered mucus were suctioned and cleared.    The observed mucosa of the trachea, main jhony, left mainstem bronchus, left  lower lobe airways, right mainstem bronchus, bronchus intermedius were normal in appearance.  No endobronchial lesions noted.     Significant Surgical Tasks Conducted by the Assistant(s), if Applicable:  Patient monitoring      Complications: None; patient tolerated the procedure well.     Estimated Blood Loss (EBL): No blood loss           Implants:  None     Specimens:  Bronchial washings from the right lower lobe    -follow-up for cultures and cytology       Condition: Remains critical        Disposition: Remains on vent in ICU.       Attestation: I was present and sand performed the entire procedure myself.      Serjio Licona MD  Critical Care Medicine  [facility]

## 2022-10-31 NOTE — CONSULTS
Pharmacokinetic Initial Assessment: IV Vancomycin    Assessment/Plan:    Initiate intravenous vancomycin with loading dose of 2500 mg once followed by a maintenance dose of vancomycin 1500 mg IV every 12 hours  Desired empiric serum trough concentration is 15 to 20 mcg/mL  Draw vancomycin trough level 60 min prior to fourth dose on 11/1/22 at approximately 1000.  Pharmacy will continue to follow and monitor vancomycin.      Please contact pharmacy at extension [phone] with any questions regarding this assessment.     Thank you for the consult,   Mitzi JeterD       Patient brief summary:  Brady Haines is a 72 y.o. male initiated on antimicrobial therapy with IV Vancomycin for treatment of suspected lower respiratory infection    Drug Allergies:   Review of patient's allergies indicates:  No Known Allergies    Actual Body Weight:   127 kg    Renal Function:   Estimated Creatinine Clearance: 136.9 mL/min (based on SCr of 0.7 mg/dL).,     Dialysis Method (if applicable):  N/A    CBC (last 72 hours):  Recent Labs   Lab Result Units 10/30/22  0907   WBC K/uL 9.53   Hemoglobin g/dL 13.9*   Hematocrit % 46.7   Platelets K/uL 206   Gran % % 81.5*   Lymph % % 7.6*   Mono % % 8.6   Eosinophil % % 1.2   Basophil % % 0.3   Differential Method  Automated       Metabolic Panel (last 72 hours):  Recent Labs   Lab Result Units 10/30/22  0907 10/30/22  1007   Sodium mmol/L 144  --    Potassium mmol/L 4.7  --    Chloride mmol/L 99  --    CO2 mmol/L 35*  --    Glucose mg/dL 147*  --    Glucose, UA   --  Negative   BUN mg/dL 16  --    Creatinine mg/dL 0.7  --    Albumin g/dL 3.1*  --    Total Bilirubin mg/dL 0.3  --    Alkaline Phosphatase U/L 74  --    AST U/L 16  --    ALT U/L 14  --        Drug levels (last 3 results):  No results for input(s): VANCOMYCINRA, VANCORANDOM, VANCOMYCINPE, VANCOPEAK, VANCOMYCINTR, VANCOTROUGH in the last 72 hours.    Microbiologic Results:  Microbiology Results (last 7 days)       Procedure  Component Value Units Date/Time    Blood culture #1 **CANNOT BE ORDERED STAT** [[id number]] Collected: 10/30/22 1207    Order Status: Sent Specimen: Blood from Peripheral, Hand, Right Updated: 10/30/22 1918    Blood culture #2 **CANNOT BE ORDERED STAT** [[id number]] Collected: 10/30/22 1104    Order Status: Sent Specimen: Blood from Peripheral, Antecubital, Right Updated: 10/30/22 1918    Culture, Respiratory with Gram Stain [[id number]] Collected: 10/30/22 1600    Order Status: Sent Specimen: Respiratory from Sputum, Expectorated Updated: 10/30/22 1917    Influenza A & B by Molecular [[id number]] Collected: 10/30/22 0913    Order Status: Completed Specimen: Nasopharyngeal Swab Updated: 10/30/22 0938     Influenza A, Molecular Negative     Influenza B, Molecular Negative     Flu A & B Source Nasal swab          Thank you for allowing us to participate in this patient's care.     Leslie Reyes PharmD 10/31/2022 12:32 AM

## 2022-10-31 NOTE — PLAN OF CARE
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 No    Discharge Plan A: Return to nursing home       No Pharmacies Listed    Initial Assessment (most recent)       Adult Discharge Assessment - 10/31/22 1139          Discharge Assessment    Assessment Type Discharge Planning Assessment     Confirmed/corrected address, phone number and insurance Yes     Confirmed Demographics Correct on Facesheet     Source of Information facility verbal report;health record     Communicated ABDIAS with patient/caregiver Date not available/Unable to determine     Reason For Admission Acute respiratory failure with hypoxia     Lives With facility resident     Facility Arrived From: [facility]     Do you expect to return to your current living situation? Yes     Do you have help at home or someone to help you manage your care at home? Yes     Who are your caregiver(s) and their phone number(s)? facility staff     Prior to hospitilization cognitive status: Alert/Oriented     Current cognitive status: Coma/Sedated/Intubated     Walking or Climbing Stairs Difficulty ambulation difficulty, requires equipment     Mobility Management WC     Dressing/Bathing Difficulty bathing difficulty, assistance 1 person     Home Accessibility wheelchair  accessible     Home Layout Able to live on 1st floor     Equipment Currently Used at Home wheelchair     Readmission within 30 days? No     Patient currently being followed by outpatient case management? No     Do you currently have service(s) that help you manage your care at home? No     Do you take prescription medications? Yes     Do you have prescription coverage? Yes     Do you have any problems affording any of your prescribed medications? No     Is the patient taking medications as prescribed? yes     Who is going to help you get home at discharge? facility staff/transport     How do you get to doctors appointments? health plan transportation     Are you on dialysis? No     Do you take coumadin? No     Discharge Plan A Return to nursing home     DME Needed Upon Discharge  none     Discharge Plan discussed with: Patient     Discharge Barriers Identified None                   Anticipated DC dispo: return to NH   Prior Level of Function: dependent with ADLs   PCP: Mickie Hui MD    Comments:  Chart review completed and facility staff report obtained for discharge planning. Patient lis a resident at [facility]. Facility staff will be help at home and can provide transport at time of discharge. CM discharge needs depends on hospital progress. CM will continue following to assist with other needs.

## 2022-10-31 NOTE — PROGRESS NOTES
Therapy with vancomycin complete and/or consult discontinued by provider.  Pharmacy will sign off, please re-consult as needed.    Thank you for allowing us to participate in this patient's care.   Katherine McArdle, Pharm.D. 10/31/2022 9:57 AM

## 2022-10-31 NOTE — PROGRESS NOTES
[facility] - Intensive Care ([facility] Medicine  Progress Note    Patient Name: Brady Haines  MRN: [id number]  Patient Class: IP- Inpatient   Admission Date: 10/30/2022  Length of Stay: 1 days  Attending Physician: Guerline Arevalo, *  Primary Care Provider: Mickie Hui MD        Subjective:     Principal Problem:<principal problem not specified>        HPI:  Patient was intubated and sedated at the time of examination, got most of information from documentation    Brady Haines is a 72 y.o. male patient with a PMHx of Hypertension and Sepsis due to methicillin susceptible Staphylococcus aureus (MSSA) with acute hypercapnic respiratory failure without septic shock (HCC) (5/2/2022) who presents to the Emergency Department for evaluation of unresponsiveness. Pt was found unresponsive at a nursing home this morning. Per EMS, pt was found with agonal breathing and O2 sat of 40%. Pt was found with blood glucose of 100. EMS tried to intubate en route but was unsuccessful. EMS was also unsuccessful at starting an IV. Per EMS, pt responds to painful stimuli. Symptoms are constant and moderate in severity. HPI and ROS limited due to pt being unresponsive.    Patient is a group home resident of [facility] and requires 24/7 care and support for all activities. As per wife- patient has a history of prolonged  hospitalization at [facility] in May 2022: [facility] due Spine Abscess: (Staph and acinetobacter)  Unable to participate in physical therapy due to knee joint issues ;           Overview/Hospital Course:  10/31     Examination done at bedside, patient currently intubated, sedated, pressor support as needed to maintain map above 65;  Possible bronch given finding of atelectasis.    Remedesvir, dexamethasone for COVID          Review of Systems    Intubated and sedated, unable to obtain review of systems     Objective:     Vital Signs (Most Recent):  Temp: 98.7 °F (37.1 °C) (10/31/22 1100)  Pulse:  83 (10/31/22 1115)  Resp: (!) 30 (10/31/22 1115)  BP: (!) 90/52 (10/31/22 1115)  SpO2: 100 % (10/31/22 1115)   Vital Signs (24h Range):  Temp:  [97.6 °F (36.4 °C)-99.7 °F (37.6 °C)] 98.7 °F (37.1 °C)  Pulse:  [] 83  Resp:  [12-79] 30  SpO2:  [78 %-100 %] 100 %  BP: ()/(37-77) 90/52  Arterial Line BP: ()/(52-86) 70/66     Weight: 127 kg (279 lb 15.8 oz)  Body mass index is 35 kg/m².    Intake/Output Summary (Last 24 hours) at 10/31/2022 1213  Last data filed at 10/31/2022 1200  Gross per 24 hour   Intake 4670.78 ml   Output 1230 ml   Net 3440.78 ml      Physical Exam    Constitutional: Patient is in severe distress. Well-developed and well-nourished.  Head: Atraumatic. Normocephalic.  Eyes: PERRL. EOM intact. Conjunctivae are not pale. No scleral icterus.  ENT: dentures  Neck: Supple. Full ROM. No lymphadenopathy.  Cardiovascular: Regular rate. Regular rhythm. No murmurs, rubs, or gallops. Distal pulses are 2+ and symmetric.  Pulmonary/Chest: BVM in process  Abdominal: Soft and non-distended.  There is no tenderness.  No rebound, guarding, or rigidity.  Musculoskeletal: No edema.   Skin: Warm and dry.  Neurological:  intubated and sedated;     Significant Labs:     Results for orders placed or performed during the hospital encounter of 10/30/22   Blood culture #1 **CANNOT BE ORDERED STAT**    Specimen: Peripheral, Hand, Right; Blood   Result Value Ref Range    Blood Culture, Routine No Growth to date    Blood culture #2 **CANNOT BE ORDERED STAT**    Specimen: Peripheral, Antecubital, Right; Blood   Result Value Ref Range    Blood Culture, Routine No Growth to date    Influenza A & B by Molecular    Specimen: Nasopharyngeal Swab   Result Value Ref Range    Influenza A, Molecular Negative Negative    Influenza B, Molecular Negative Negative    Flu A & B Source Nasal swab    Culture, Respiratory with Gram Stain    Specimen: Sputum, Expectorated; Respiratory   Result Value Ref Range    Respiratory  Culture Specimen inadequate - culture not performed     Gram Stain (Respiratory) >10epis/lfp and <than many WBC's     Gram Stain (Respiratory)       Predominance of oropharyngeal mounika. Please recollect.   CBC auto differential   Result Value Ref Range    WBC 9.53 3.90 - 12.70 K/uL    RBC 4.53 (L) 4.60 - 6.20 M/uL    Hemoglobin 13.9 (L) 14.0 - 18.0 g/dL    Hematocrit 46.7 40.0 - 54.0 %     (H) 82 - 98 fL    MCH 30.7 27.0 - 31.0 pg    MCHC 29.8 (L) 32.0 - 36.0 g/dL    RDW 15.6 (H) 11.5 - 14.5 %    Platelets 206 150 - 450 K/uL    MPV 9.3 9.2 - 12.9 fL    Immature Granulocytes 0.8 (H) 0.0 - 0.5 %    Gran # (ANC) 7.8 (H) 1.8 - 7.7 K/uL    Immature Grans (Abs) 0.08 (H) 0.00 - 0.04 K/uL    Lymph # 0.7 (L) 1.0 - 4.8 K/uL    Mono # 0.8 0.3 - 1.0 K/uL    Eos # 0.1 0.0 - 0.5 K/uL    Baso # 0.03 0.00 - 0.20 K/uL    nRBC 0 0 /100 WBC    Gran % 81.5 (H) 38.0 - 73.0 %    Lymph % 7.6 (L) 18.0 - 48.0 %    Mono % 8.6 4.0 - 15.0 %    Eosinophil % 1.2 0.0 - 8.0 %    Basophil % 0.3 0.0 - 1.9 %    Differential Method Automated    Comprehensive metabolic panel   Result Value Ref Range    Sodium 144 136 - 145 mmol/L    Potassium 4.7 3.5 - 5.1 mmol/L    Chloride 99 95 - 110 mmol/L    CO2 35 (H) 23 - 29 mmol/L    Glucose 147 (H) 70 - 110 mg/dL    BUN 16 8 - 23 mg/dL    Creatinine 0.7 0.5 - 1.4 mg/dL    Calcium 9.0 8.7 - 10.5 mg/dL    Total Protein 7.6 6.0 - 8.4 g/dL    Albumin 3.1 (L) 3.5 - 5.2 g/dL    Total Bilirubin 0.3 0.1 - 1.0 mg/dL    Alkaline Phosphatase 74 55 - 135 U/L    AST 16 10 - 40 U/L    ALT 14 10 - 44 U/L    Anion Gap 10 8 - 16 mmol/L    eGFR >60 >60 mL/min/1.73 m^2   Brain natriuretic peptide   Result Value Ref Range    BNP 34 0 - 99 pg/mL   Troponin I   Result Value Ref Range    Troponin I <0.006 0.000 - 0.026 ng/mL   Lactic acid, plasma   Result Value Ref Range    Lactate (Lactic Acid) 0.7 0.5 - 2.2 mmol/L   Procalcitonin   Result Value Ref Range    Procalcitonin 0.02 <0.25 ng/mL   Urinalysis, Reflex to Urine  Culture Urine, Catheterized    Specimen: Urine   Result Value Ref Range    Specimen UA Urine, Catheterized     Color, UA Yellow Yellow, Straw, Neelam    Appearance, UA Clear Clear    pH, UA 6.0 5.0 - 8.0    Specific Gravity, UA 1.020 1.005 - 1.030    Protein, UA 1+ (A) Negative    Glucose, UA Negative Negative    Ketones, UA Negative Negative    Bilirubin (UA) Negative Negative    Occult Blood UA Negative Negative    Nitrite, UA Negative Negative    Urobilinogen, UA 2.0-3.0 (A) <2.0 EU/dL    Leukocytes, UA Negative Negative   COVID-19 Rapid Screening   Result Value Ref Range    SARS-CoV-2 RNA, Amplification, Qual Positive (A) Negative   Urinalysis Microscopic   Result Value Ref Range    RBC, UA 0 0 - 4 /hpf    WBC, UA 1 0 - 5 /hpf    Bacteria None None-Occ /hpf    Hyaline Casts, UA 3 (A) 0-1/lpf /lpf    Microscopic Comment MUCUS PRESENT    Ferritin   Result Value Ref Range    Ferritin 52 20.0 - 300.0 ng/mL   C-reactive protein   Result Value Ref Range    CRP 21.1 (H) 0.0 - 8.2 mg/L   Sedimentation rate   Result Value Ref Range    Sed Rate 27 (H) 0 - 10 mm/Hr   Procalcitonin   Result Value Ref Range    Procalcitonin 0.03 <0.25 ng/mL   TSH   Result Value Ref Range    TSH 0.233 (L) 0.400 - 4.000 uIU/mL   Ammonia   Result Value Ref Range    Ammonia SEE COMMENT umol/L   T4, Free   Result Value Ref Range    Free T4 0.89 0.71 - 1.51 ng/dL   CBC Auto Differential   Result Value Ref Range    WBC 14.14 (H) 3.90 - 12.70 K/uL    RBC 4.12 (L) 4.60 - 6.20 M/uL    Hemoglobin 12.7 (L) 14.0 - 18.0 g/dL    Hematocrit 39.4 (L) 40.0 - 54.0 %    MCV 96 82 - 98 fL    MCH 30.8 27.0 - 31.0 pg    MCHC 32.2 32.0 - 36.0 g/dL    RDW 16.0 (H) 11.5 - 14.5 %    Platelets 241 150 - 450 K/uL    MPV 9.8 9.2 - 12.9 fL    Immature Granulocytes 0.5 0.0 - 0.5 %    Gran # (ANC) 10.8 (H) 1.8 - 7.7 K/uL    Immature Grans (Abs) 0.07 (H) 0.00 - 0.04 K/uL    Lymph # 2.1 1.0 - 4.8 K/uL    Mono # 1.2 (H) 0.3 - 1.0 K/uL    Eos # 0.0 0.0 - 0.5 K/uL    Baso # 0.02  0.00 - 0.20 K/uL    nRBC 0 0 /100 WBC    Gran % 76.4 (H) 38.0 - 73.0 %    Lymph % 14.6 (L) 18.0 - 48.0 %    Mono % 8.3 4.0 - 15.0 %    Eosinophil % 0.1 0.0 - 8.0 %    Basophil % 0.1 0.0 - 1.9 %    Differential Method Automated    Basic Metabolic Panel   Result Value Ref Range    Sodium 139 136 - 145 mmol/L    Potassium 4.0 3.5 - 5.1 mmol/L    Chloride 103 95 - 110 mmol/L    CO2 29 23 - 29 mmol/L    Glucose 140 (H) 70 - 110 mg/dL    BUN 20 8 - 23 mg/dL    Creatinine 0.7 0.5 - 1.4 mg/dL    Calcium 8.1 (L) 8.7 - 10.5 mg/dL    Anion Gap 7 (L) 8 - 16 mmol/L    eGFR >60 >60 mL/min/1.73 m^2   Magnesium   Result Value Ref Range    Magnesium 1.7 1.6 - 2.6 mg/dL   Procalcitonin   Result Value Ref Range    Procalcitonin 0.05 <0.25 ng/mL   Echo   Result Value Ref Range    TDI SEPTAL 0.06 m/s    LV LATERAL E/E' RATIO 5.88 m/s    LV SEPTAL E/E' RATIO 7.83 m/s    LA WIDTH 3.50 cm    Left Ventricular Outflow Tract Mean Velocity 0.61 cm/s    Left Ventricular Outflow Tract Mean Gradient 1.59 mmHg    TDI LATERAL 0.08 m/s    LVIDd 3.77 3.5 - 6.0 cm    IVS 2.10 (A) 0.6 - 1.1 cm    Posterior Wall 1.82 (A) 0.6 - 1.1 cm    Ao root annulus 4.61 cm    LVIDs 2.68 2.1 - 4.0 cm    FS 29 28 - 44 %    LA volume 44.72 cm3    Sinus 4.75 cm    STJ 4.20 cm    Ascending aorta 4.04 cm    LV mass 334.38 g    LA size 3.59 cm    RVDD 4.45 cm    Left Ventricle Relative Wall Thickness 0.97 cm    AV mean gradient 4 mmHg    AV valve area 2.73 cm2    AV Velocity Ratio 0.60     AV index (prosthetic) 0.60     MV valve area p 1/2 method 2.78 cm2    E/A ratio 1.15     Mean e' 0.07 m/s    E wave deceleration time 273.04 msec    IVRT 91.34 msec    LVOT diameter 2.40 cm    LVOT area 4.5 cm2    LVOT peak fernando 0.71 m/s    LVOT peak VTI 13.70 cm    Ao peak fernando 1.18 m/s    Ao VTI 22.7 cm    RVOT peak fernando 0.73 m/s    RVOT peak VTI 11.3 cm    LVOT stroke volume 61.95 cm3    AV peak gradient 6 mmHg    PV mean gradient 0.99 mmHg    E/E' ratio 6.71 m/s    MV Peak E Fernando 0.47  m/s    TR Max Fernando 2.14 m/s    MV stenosis pressure 1/2 time 79.18 ms    MV Peak A Fernando 0.41 m/s    LV Systolic Volume 26.50 mL    LV Diastolic Volume 60.60 mL    RA Major Axis 4.43 cm    Left Atrium Minor Axis 4.08 cm    Left Atrium Major Axis 4.30 cm    Triscuspid Valve Regurgitation Peak Gradient 18 mmHg    RA Width 2.90 cm    EF 50 %   ISTAT PROCEDURE   Result Value Ref Range    POC PH 7.257 (LL) 7.35 - 7.45    POC PCO2 98.5 (HH) 35 - 45 mmHg    POC PO2 71 (L) 80 - 100 mmHg    POC HCO3 43.9 (H) 24 - 28 mmol/L    POC BE 17 -2 to 2 mmol/L    POC SATURATED O2 89 (L) 95 - 100 %    Rate 20     Sample ARTERIAL     Site RR     Allens Test Pass     DelSys Adult Vent     Mode AC/PRVC     Vt 450     PEEP 8     FiO2 100    ISTAT PROCEDURE   Result Value Ref Range    POC PH 7.408 7.35 - 7.45    POC PCO2 56.1 (HH) 35 - 45 mmHg    POC PO2 67 (L) 80 - 100 mmHg    POC HCO3 35.4 (H) 24 - 28 mmol/L    POC BE 11 -2 to 2 mmol/L    POC SATURATED O2 93 (L) 95 - 100 %    Rate 30     Sample ARTERIAL     Site RR     Allens Test Pass     DelSys Adult Vent     Mode AC/PRVC     Vt 470     PEEP 10     FiO2 100    POCT glucose   Result Value Ref Range    POCT Glucose 102 70 - 110 mg/dL   ISTAT PROCEDURE   Result Value Ref Range    POC PH 7.475 (H) 7.35 - 7.45    POC PCO2 44.3 35 - 45 mmHg    POC PO2 54 (LL) 80 - 100 mmHg    POC HCO3 32.6 (H) 24 - 28 mmol/L    POC BE 9 -2 to 2 mmol/L    POC SATURATED O2 90 (L) 95 - 100 %    Rate 30     Sample ARTERIAL     Site LR     Allens Test Pass     DelSys Adult Vent     Mode AC/PRVC     Vt 470     PEEP 10     FiO2 100    POCT glucose   Result Value Ref Range    POCT Glucose 133 (H) 70 - 110 mg/dL   ISTAT PROCEDURE   Result Value Ref Range    POC PH 7.451 (H) 7.35 - 7.45    POC PCO2 45.2 (H) 35 - 45 mmHg    POC PO2 99 80 - 100 mmHg    POC HCO3 31.5 (H) 24 - 28 mmol/L    POC BE 8 -2 to 2 mmol/L    POC SATURATED O2 98 95 - 100 %    Rate 30     Sample ARTERIAL     Site Shereen/UAC     Allens Test N/A     DelSys  Adult Vent     Mode AC/PRVC     Vt 470     PEEP 10     FiO2 90    POCT glucose   Result Value Ref Range    POCT Glucose 116 (H) 70 - 110 mg/dL   POCT glucose   Result Value Ref Range    POCT Glucose 110 70 - 110 mg/dL        Significant Imaging:     Imaging Results              CTA Chest Non-Coronary (PE Studies) (Final result)  Result time 10/30/22 13:44:24      Final result by Raheem Hendrickson MD (10/30/22 13:44:24)                   Impression:      1. No pulmonary embolism.  2. Mucous plug in the right mainstem bronchus with total atelectasis of the right middle lobe and right lower lobe.  3. Marked dependent atelectasis in the basilar segments left lower lobe and dependent atelectasis in the right upper lobe.  4. Extensive calcific atherosclerotic disease of the coronary arteries.  All CT scans at this facility are performed  using dose modulation techniques as appropriate to performed exam including the following:  automated exposure control; adjustment of mA and/or kV according to the patients size (this includes techniques or standardized protocols for targeted exams where dose is matched to indication/reason for exam: i.e. extremities or head);  iterative reconstruction technique.      Electronically signed by: Raheem Hendrickson MD  Date:    10/30/2022  Time:    13:44               Narrative:    EXAMINATION:  CTA CHEST NON CORONARY (PE STUDIES)    CLINICAL HISTORY:  Pulmonary embolism (PE) suspected, high prob;    TECHNIQUE:  Axial CTA images performed through the chest after the administration of 100 cc intravenous contrast. 3D MIP images were performed and interpreted.    COMPARISON:  None    FINDINGS:  No filling defects in the pulmonary arteries to indicate a pulmonary embolism.  Calcific atherosclerotic disease of the coronary arteries.  No pericardial effusion.  NG tube tip in the fundus of the stomach.  Right IJ central venous catheter tip in the SVC.    There is a mucous plug in the right mainstem bronchus  with total atelectasis of the right middle lobe and right lower lobe.  There is dependent atelectasis in the right upper lobe.  There is marked atelectasis in the basilar segments of the left lower lobe.  No pleural effusion or pneumothorax.    No acute osseous abnormality.                                       X-Ray Chest 1 View (Final result)  Result time 10/30/22 11:06:21      Final result by Raheem Hendrickson MD (10/30/22 11:06:21)                   Impression:      See above      Electronically signed by: Raheem Hendrickson MD  Date:    10/30/2022  Time:    11:06               Narrative:    EXAMINATION:  XR CHEST 1 VIEW    CLINICAL HISTORY:  Central line plcmnt;    COMPARISON:  Chest x-ray performed less than 2 hours ago    FINDINGS:  Endotracheal tube in good position.  NG tube tip in the fundus of the stomach.  Newly placed right IJ central venous catheter tip in the SVC.  No pneumothorax.  Right middle lobe and right lower lobe total atelectasis.  Marked left basilar atelectasis.                                       CT Head Without Contrast (Final result)  Result time 10/30/22 09:52:01      Final result by Raheem Hendrickson MD (10/30/22 09:52:01)                   Impression:      1. No acute intracranial process.  2. 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  3. Fluid filling the right middle ear cavity which can be seen with otitis media.  4. Paranasal sinus disease.  All CT scans at this facility are performed  using dose modulation techniques as appropriate to performed exam including the following:  automated exposure control; adjustment of mA and/or kV according to the patients size (this includes techniques or standardized protocols for targeted exams where dose is matched to indication/reason for exam: i.e. extremities or head);  iterative reconstruction technique.      Electronically signed by: Raheem Hendrickson  MD  Date:    10/30/2022  Time:    09:52               Narrative:    EXAMINATION:  CT HEAD WITHOUT CONTRAST    CLINICAL HISTORY:  Mental status change, unknown cause;    TECHNIQUE:  Axial CT imaging was performed through the head without intravenous contrast.    COMPARISON:  None    FINDINGS:  No hydrocephalus, midline shift, mass effect, or acute intracranial hemorrhage. Chronic small vessel ischemic change of the white matter.  Old lacunar infarcts in the left caudate nucleus head and body.  Mucosal thickening of the paranasal sinuses.  Fluid filling the right middle ear cavity..  The skull is intact.                                       X-Ray Chest AP Portable (Final result)  Result time 10/30/22 09:26:02      Final result by Raheem Hendrickson MD (10/30/22 09:26:02)                   Impression:      See above      Electronically signed by: Raheem Hendrickson MD  Date:    10/30/2022  Time:    09:26               Narrative:    EXAMINATION:  XR CHEST AP PORTABLE    CLINICAL HISTORY:  Presence of other specified devices    COMPARISON:  None.    FINDINGS:  Endotracheal tube in good position.  The NG tube courses down the esophagus with its tip in the fundus of the stomach.  There are markedly elevated hemidiaphragms with marked low lung volumes.  Right middle lobe and right lower lobe total atelectasis with a cut off sign of the right mainstem bronchus.  Marked left basilar atelectasis.  No pneumothorax.                                         Assessment/Plan:      Right middle lobe pneumonia  CTA chest: No pulmonary embolism. Mucous plug in the right mainstem bronchus with total atelectasis of the right middle lobe and right lower lobe.   Marked dependent atelectasis in the basilar segments left lower lobe and dependent atelectasis in the right upper lobe. Extensive calcific atherosclerotic disease of the coronary arteries.  All CT scans at this facility are performed  using dose modulation techniques as appropriate to  performed exam including the following:  automated exposure control; adjustment of mA and/or kV according to the patients size (this includes techniques or standardized protocols for targeted exams where dose is matched to indication/reason for exam: i.e. extremities or head);  iterative reconstruction technique.Sputum culture  ? Aspiration pneumonia with mucus plug;   Possible plan for bronchoscopy, antibiotics, follow-up on cultures      COVID-19 virus antibody detected  COVID positive, intubated  Remedesvir, dexamethasone      On mechanically assisted ventilation  Intubated  Spontaneous breathing trials   ABG      Acute deep vein thrombosis (DVT) of tibial vein of both lower extremities  On Eliquis  CTA chest negative for PE      Hypertension  Home meds  Trops -ve  echo      Atelectasis  Bronchodilators  Possible bronch for mucus plugs       Acute respiratory failure with hypoxia    Intubated   Bronch for underlying atelectasis      VTE Risk Mitigation (From admission, onward)         Ordered     apixaban tablet 5 mg  2 times daily         10/31/22 1007     IP VTE HIGH RISK PATIENT  Once         10/30/22 1513     Place MELANIE hose  Until discontinued         10/30/22 1214     Place sequential compression device  Until discontinued         10/30/22 1214     Place sequential compression device  Until discontinued         10/30/22 1211                Discharge Planning   ABDIAS:      Code Status: Full Code   Is the patient medically ready for discharge?:     Reason for patient still in hospital (select all that apply):  Intubated, sedated  Discharge Plan A: Return to nursing home            Critical care time spent on the evaluation and treatment of severe organ dysfunction, review of pertinent labs and imaging studies, discussions with consulting providers and discussions with patient/family: 57 minutes.      Guerline Arevalo MD  Department of Hospital Medicine   [facility] - Intensive Care ([facility])

## 2022-10-31 NOTE — ASSESSMENT & PLAN NOTE
Acute hypercapnic and hypoxemic respiratory failure  Uses home oxygen: No  Signs & symptoms of acute resp failure:  increased work of breathing, lethargy and cyanosis.  Recent ABG reviewed: Yes  Contributing diagnoses: pneumonia, atelectasis    Plan:  -improving.  Supplemental oxygen as needed  -continue Abx  -Hypertonic saline nebs  -CPT

## 2022-10-31 NOTE — PROGRESS NOTES
[facility] - Intensive Care ([facility])  Critical Care Medicine  Progress Note    Patient Name: Brady Haines  MRN: [id number]  Admission Date: 10/30/2022  Hospital Length of Stay: 1 days  Code Status: Full Code  Attending Provider: Guerline Arevalo, *  Primary Care Provider: Mickie Hui MD   Principal Problem: <principal problem not specified>    Subjective:     HPI:  Brady Haines is 72 y.o.  Seen in ER  Spouse at bedside  Brought from [facility] found down< Bagged and intubated in ER  Unresponsive, Hypothermia, elevated pCO2  Unable to intubate in field  Got rocoronium + etomidate  pCO2 was 98  Never smoker, deny COPD, marie,   HX long hospitalization May 2022: [facility]: Spine Abcess: Staphy and acinetobacter  Was in NH  Unable to do PT due to Knee issues  T max: 95.1 F      Meds reveiwed: on Eliquis      No past medical history on file.     Hospital/ICU Course:  10/30:  Intubated.  Broad Abx.  10/31:  Remains on vent.  Sedated.  Significant atelectasis on CXR.  Anticipate bronch for airway clearance. Cotninue CFP.  Discontinue vanco, Flagyl.  Resume Eliquis.    Interval History:     Unable to obtain interval history or ROS from patient due to intubation and sedation.      Objective:     Vital Signs (Most Recent):  Temp: 98 °F (36.7 °C) (10/31/22 0500)  Pulse: 82 (10/31/22 0903)  Resp: (!) 30 (10/31/22 0903)  BP: (!) 84/55 (10/31/22 0903)  SpO2: 95 % (10/31/22 0903)   Vital Signs (24h Range):  Temp:  [95 °F (35 °C)-99.7 °F (37.6 °C)] 98 °F (36.7 °C)  Pulse:  [] 82  Resp:  [12-79] 30  SpO2:  [78 %-100 %] 95 %  BP: ()/(37-88) 84/55  Arterial Line BP: ()/(56-81) 121/72     Weight: 127 kg (279 lb 15.8 oz)  Body mass index is 35 kg/m².      Intake/Output Summary (Last 24 hours) at 10/31/2022 0942  Last data filed at 10/31/2022 0600  Gross per 24 hour   Intake 3452.04 ml   Output 720 ml   Net 2732.04 ml       Physical Exam  Vitals reviewed.   Constitutional:        Interventions: He is sedated and intubated.   HENT:      Head: Normocephalic and atraumatic.      Nose: Nose normal.   Eyes:      Pupils: Pupils are equal, round, and reactive to light.   Cardiovascular:      Rate and Rhythm: Normal rate and regular rhythm.   Pulmonary:      Effort: Pulmonary effort is normal. No respiratory distress. He is intubated.      Comments: Symmetric chest rise.  No dyssynchrony or distress on the vent.  Abdominal:      General: There is no distension.      Palpations: Abdomen is soft.   Musculoskeletal:         General: No deformity or signs of injury.      Right lower leg: No edema.      Left lower leg: No edema.   Skin:     General: Skin is warm and dry.   Neurological:      General: No focal deficit present.      Mental Status: He is oriented to person, place, and time.       Vents:  Vent Mode: A/C (10/31/22 0903)  Ventilator Initiated: Yes (10/30/22 0846)  Set Rate: 30 BPM (10/31/22 0903)  Vt Set: 470 mL (10/31/22 0903)  Pressure Support: 0 cmH20 (10/31/22 0903)  PEEP/CPAP: 10 cmH20 (10/31/22 0903)  Oxygen Concentration (%): 80 (10/31/22 0903)  Peak Airway Pressure: 29 cmH2O (10/31/22 0903)  Plateau Pressure: 0 cmH20 (10/31/22 0903)  Total Ve: 14.5 L/m (10/31/22 0903)  F/VT Ratio<105 (RSBI): (!) 62.37 (10/31/22 0903)    Lines/Drains/Airways       Central Venous Catheter Line  Duration             Percutaneous Central Line Insertion/Assessment - Triple Lumen  10/30/22 1051 right internal jugular <1 day              Drain  Duration                  NG/OG Tube 10/30/22 0910 [place] sump 16 Fr. Left mouth 1 day         Urethral Catheter 10/30/22 1000 16 Fr. <1 day              Airway  Duration                  Airway - Non-Surgical 10/30/22 0842 1 day              Arterial Line  Duration             Arterial Line 10/30/22 2205 Left Radial <1 day              Peripheral Intravenous Line  Duration                  Peripheral IV - Single Lumen 10/30/22 0845 20 G Left Hand 1 day          Peripheral IV - Single Lumen 10/30/22 1105 20 G Right Antecubital <1 day                    Significant Labs:    CBC/Anemia Profile:  Recent Labs   Lab 10/30/22  0907 10/30/22  1402 10/31/22  0450   WBC 9.53  --  14.14*   HGB 13.9*  --  12.7*   HCT 46.7  --  39.4*     --  241   *  --  96   RDW 15.6*  --  16.0*   FERRITIN  --  52  --         Chemistries:  Recent Labs   Lab 10/30/22  0907 10/31/22  0450    139   K 4.7 4.0   CL 99 103   CO2 35* 29   BUN 16 20   CREATININE 0.7 0.7   CALCIUM 9.0 8.1*   ALBUMIN 3.1*  --    PROT 7.6  --    BILITOT 0.3  --    ALKPHOS 74  --    ALT 14  --    AST 16  --    MG  --  1.7       A1C: No results for input(s): HGBA1C in the last 48 hours.  Blood Culture:   Recent Labs   Lab 10/30/22  1104 10/30/22  1207   LABBLOO No Growth to date No Growth to date     Coagulation: No results for input(s): PT, INR, APTT in the last 48 hours.  Lactic Acid:   Recent Labs   Lab 10/30/22  0907   LACTATE 0.7     Pathology Results  (Last 10 years)      None          POCT Glucose:   Recent Labs   Lab 10/30/22  1401 10/30/22  2258 10/31/22  0554   POCTGLUCOSE 102 133* 116*     Respiratory Culture:   Recent Labs   Lab 10/30/22  1600   GSRESP >10epis/lfp and <than many WBC's  Predominance of oropharyngeal mounika. Please recollect.   RESPIRATORYC Specimen inadequate - culture not performed     Troponin:   Recent Labs   Lab 10/30/22  0907   TROPONINI <0.006     Urine Culture: No results for input(s): LABURIN in the last 48 hours.  Urine Studies:   Recent Labs   Lab 10/30/22  1007   COLORU Yellow   APPEARANCEUA Clear   PHUR 6.0   SPECGRAV 1.020   PROTEINUA 1+*   GLUCUA Negative   KETONESU Negative   BILIRUBINUA Negative   OCCULTUA Negative   NITRITE Negative   UROBILINOGEN 2.0-3.0*   LEUKOCYTESUR Negative   RBCUA 0   WBCUA 1   BACTERIA None   HYALINECASTS 3*       Significant Imaging:  I have reviewed all pertinent imaging results/findings within the past 24 hours.      ABG  Recent Labs   Lab  10/31/22  0351   PH 7.451*   PO2 99   PCO2 45.2*   HCO3 31.5*   BE 8     Assessment/Plan:     Pulmonary  Right middle lobe pneumonia  Sputum culture  Possible aspiration  Abx: CEFEPIME  Chest CT reviewed  Blood cultures    On mechanically assisted ventilation  Pulmonary:  Continue ventilator support.            Ventilator Data (Last 24H):     Vent Mode: A/C  Oxygen Concentration (%):  [] 80  Resp Rate Total:  [30 br/min] 30 br/min  Vt Set:  [470 mL] 470 mL  PEEP/CPAP:  [10 cmH20] 10 cmH20  Pressure Support:  [0 cmH20] 0 cmH20  Mean Airway Pressure:  [18 gyH18-83 cmH20] 19 cmH20      Ventilator settings reviewed and adjusted to optimize gas exchange.      Proceed with weaning trials on a support/weaning mode on the ventilator when the following goals are met:  Patient Criteria:  - Spontaneous breathing in CPAP  - Able to lift head off pillow  - Hemodynamic stability  - HR: 60 to 110 bpm  - ABP: greater than 90/50  - RR: 10 to 28 bpm  - Secretion management/adequate cough  - Acceptable arterial blood gases     Ventilator Criteria:  - FiO2: less than or equal to 40%  - PEEP less than or equal to 8 to 10 cm H2O     Bronchodilators per protocol.    Sedation: Fentanyl + Propofol    Serial ABG    AM CXR    Tube feeds    Miralax, Colace    Sliding scale    On ELIQUIS    Atelectasis  Bronchodilators  Acetylcysteine + hypersal  Bronch today to attempt to clear any mucus plugs.    Acute respiratory failure with hypoxia  Acute hypercapnic and hypoxemic respiratory failure  Uses home oxygen: No  Signs & symptoms of acute resp failure:  increased work of breathing, lethargy and cyanosis.  Recent ABG reviewed: Yes  Contributing diagnoses: pneumonia, atelectasis    Plan:  -Continue vent and adjust as needed  -Abx  -Bronch for airway clearance  -Hypertonic saline and acetylcysteine nebs  -CPT          Cardiac/Vascular  Hypertension  Home meds: NORVASC, LOSARTAN.    Hyperlipidemia  MED: LIPITOR    ID  COVID-19 virus antibody  detected  Patient is vaccinated    COVID risk score 6     REMDESIVIR  DEXAMETHASONE  ZINC  Vit C  resp path PCR               Hematology  Acute deep vein thrombosis (DVT) of tibial vein of both lower extremities  On ELIQUIS  Resume eliquis    GI  Gastroesophageal reflux disease without esophagitis  PEPCID  Tube feed recs     Critical Care Daily Checklist:    A: Awake: RASS Goal/Actual Goal: RASS Goal: -2-->light sedation  Actual: Fleming Agitation Sedation Scale (RASS): (P) Light sedation   B: Spontaneous Breathing Trial Performed?     C: SAT & SBT Coordinated?  Yes, as able per criteria                      D: Delirium: CAM-ICU Overall CAM-ICU: (P) Positive   E: Early Mobility Performed? Yes   F: Feeding Goal: Goals: 1. Pt will be initiated onto EN feeding within 24-48hrs. 2. Pt will tolerate >60% of energy needs by RD follow up  Status: Nutrition Goal Status: new   Current Diet Order   Procedures    Diet NPO      AS: Analgesia/Sedation Propofol and fentanyl drips   T: Thromboembolic Prophylaxis Eliquis   H: HOB > 300 Yes   U: Stress Ulcer Prophylaxis (if needed) Pepcid   G: Glucose Control Subcu insulin   B: Bowel Function     I: Indwelling Catheter (Lines & Rojas) Necessity R IJ cvc  L radial A-line  Rojas   D: De-escalation of Antimicrobials/Pharmacotherapies Continue CFP  Discontinue vanco, flagyl    Plan for the day/ETD Continue vent and sedation  Wean as able  Bronch for airway clearance  Abx  Steroids. RDV for COVID    Code Status:  Family/Goals of Care: Full Code       Critical Care Time: 60 minutes  Critical secondary to Patient has a condition that poses threat to life and bodily function: Acute resp failure 2/2 pneumonia and atelectasis requiring mechanical ventilation and continuous infusion of sedative medications.      Critical care was time spent personally by me on the following activities: development of treatment plan with patient or surrogate and bedside caregivers, discussions with consultants,  evaluation of patient's response to treatment, examination of patient, ordering and performing treatments and interventions, ordering and review of laboratory studies, ordering and review of radiographic studies, pulse oximetry, re-evaluation of patient's condition. This critical care time did not overlap with that of any other provider or involve time for any procedures.     Serjio Licona MD  Critical Care Medicine  [facility] - Intensive Care \[facility]\"")

## 2022-10-31 NOTE — ASSESSMENT & PLAN NOTE
Patient is vaccinated    COVID risk score 6     REMDESIVIR  DEXAMETHASONE  ZINC  Vit C  resp path PCR

## 2022-10-31 NOTE — HOSPITAL COURSE
10/31     Examination done at bedside, patient currently intubated, sedated, pressor support as needed to maintain map above 65;  Possible bronch given finding of atelectasis.    Remedesvir, dexamethasone for COVID    11/1:  Examination done at bedside, intubated, sedated, plan for SBT trial today   Status post bronch yesterday.  Labs reviewed   Tachycardic, hypertensive  COVID management    11/2     Examination done at bedside.  Patient got extubated currently on 3 L nasal cannula   Off of drips , on p.o. blood pressure medications   Passed bedside swallow evaluation   Labs reviewed, creatinine stable   Medications for COVID    11/3    Examination of patient denied bedside.  Patient denied any acute issues, stated improvement in shortness of breath.    Hemodynamically stable, labs reviewed.    Underwent home oxygen evaluation- qualified for 4 L (93% on 4LPM )-  working on arrangements.    Considering clinical and hemodynamic stability plan to discharge today.  Provided instructions to be followed upon discharge.  Patient agreed to the current plan, transferring to [facility] with PT arrangements

## 2022-10-31 NOTE — PLAN OF CARE
POC reviewed. Pt has remained NSR on monitor throughout shift. Attempted to wean levo unsuccessful. Increased sedation. Urine output increased throughout shift. Pt has had no other significant events throughout shift. Report to be given to day shift RN who will assume care.

## 2022-10-31 NOTE — SUBJECTIVE & OBJECTIVE
Interval History:     Unable to obtain interval history or ROS from patient due to intubation and sedation.      Objective:     Vital Signs (Most Recent):  Temp: 97.9 °F (36.6 °C) (11/02/22 0300)  Pulse: 107 (11/02/22 0743)  Resp: 17 (11/02/22 0743)  BP: (!) 164/92 (11/02/22 0600)  SpO2: (!) 94 % (11/02/22 0743)   Vital Signs (24h Range):  Temp:  [97.3 °F (36.3 °C)-98.1 °F (36.7 °C)] 97.9 °F (36.6 °C)  Pulse:  [] 107  Resp:  [11-26] 17  SpO2:  [92 %-100 %] 94 %  BP: (100-179)/() 164/92     Weight: 126.1 kg (278 lb)  Body mass index is 34.75 kg/m².      Intake/Output Summary (Last 24 hours) at 11/2/2022 0825  Last data filed at 11/2/2022 0600  Gross per 24 hour   Intake 2479.35 ml   Output 4985 ml   Net -2505.65 ml       Physical Exam  Vitals reviewed.   Constitutional:       Appearance: He is obese. He is ill-appearing.   HENT:      Head: Normocephalic and atraumatic.      Nose: Nose normal.   Eyes:      Pupils: Pupils are equal, round, and reactive to light.   Cardiovascular:      Rate and Rhythm: Normal rate and regular rhythm.   Pulmonary:      Effort: Pulmonary effort is normal. No respiratory distress.      Comments: Symmetric chest rise.    Abdominal:      General: There is no distension.      Palpations: Abdomen is soft.   Musculoskeletal:         General: No deformity or signs of injury.      Right lower leg: No edema.      Left lower leg: No edema.   Skin:     General: Skin is warm and dry.   Neurological:      General: No focal deficit present.      Mental Status: He is alert and oriented to person, place, and time.       Vents:  Vent Mode: Spont (11/01/22 1111)  Ventilator Initiated: Yes (10/30/22 0846)  Set Rate: 0 BPM (11/01/22 1111)  Vt Set: 470 mL (11/01/22 1111)  Pressure Support: 5 cmH20 (11/01/22 1111)  PEEP/CPAP: 8 cmH20 (11/01/22 1111)  Oxygen Concentration (%): 36 (11/02/22 0743)  Peak Airway Pressure: 14 cmH2O (11/01/22 1111)  Plateau Pressure: 20 cmH20 (11/01/22 1111)  Total Ve:  9.94 L/m (11/01/22 1111)  F/VT Ratio<105 (RSBI): (!) 17.36 (11/01/22 1111)    Lines/Drains/Airways       Central Venous Catheter Line  Duration             Percutaneous Central Line Insertion/Assessment - Triple Lumen  10/30/22 1051 right internal jugular 2 days              Drain  Duration                  Urethral Catheter 10/30/22 1000 16 Fr. 2 days              Peripheral Intravenous Line  Duration                  Peripheral IV - Single Lumen 10/30/22 0845 20 G Left Hand 2 days         Peripheral IV - Single Lumen 10/30/22 1105 20 G Right Antecubital 2 days                    Significant Labs:    CBC/Anemia Profile:  Recent Labs   Lab 11/01/22  0417 11/02/22  0525   WBC 11.66 10.56   HGB 11.9* 12.7*   HCT 38.0* 40.9    245   MCV 96 97   RDW 16.5* 16.7*        Chemistries:  Recent Labs   Lab 11/01/22  0417 11/02/22  0525    141   K 3.7 3.8    104   CO2 27 28   BUN 14 12   CREATININE 0.6 0.6   CALCIUM 8.1* 8.4*   MG 2.1 2.0       A1C: No results for input(s): HGBA1C in the last 48 hours.  Blood Culture:   No results for input(s): LABBLOO in the last 48 hours.    Coagulation: No results for input(s): PT, INR, APTT in the last 48 hours.  Lactic Acid:   No results for input(s): LACTATE in the last 48 hours.    Pathology Results  (Last 10 years)      None          POCT Glucose:   Recent Labs   Lab 11/01/22  1209 11/01/22  1902 11/02/22  0013   POCTGLUCOSE 110 106 83     Respiratory Culture:   No results for input(s): GSRESP, RESPIRATORYC in the last 48 hours.    Troponin:   No results for input(s): TROPONINI in the last 48 hours.    Urine Culture: No results for input(s): LABURIN in the last 48 hours.  Urine Studies:   No results for input(s): COLORU, APPEARANCEUA, PHUR, SPECGRAV, PROTEINUA, GLUCUA, KETONESU, BILIRUBINUA, OCCULTUA, NITRITE, UROBILINOGEN, LEUKOCYTESUR, RBCUA, WBCUA, BACTERIA, SQUAMEPITHEL, HYALINECASTS in the last 48 hours.    Invalid input(s): PHILIP      Significant Imaging:  I  have reviewed all pertinent imaging results/findings within the past 24 hours.

## 2022-10-31 NOTE — PLAN OF CARE
POC reviewed with spouse. GCS of 7. Propofol and Fentanyl gtt continued to maintain a RASS goal of -2. NSR on telemetry. Levophed continued to maintain a MAP of  >65. Bronchoscopy done today per MD Licona, see note. Pt intubated with FIO2 65, Rate of 30 and PEEP of 10 at this time. Rojas catheter maintained with adequate UOP. Patient turned every 2 hours and heel boots in place. Safety precautions maintained and alarms audible. NADN.

## 2022-10-31 NOTE — SUBJECTIVE & OBJECTIVE
Review of Systems    Intubated and sedated, unable to obtain review of systems     Objective:     Vital Signs (Most Recent):  Temp: 98.7 °F (37.1 °C) (10/31/22 1100)  Pulse: 83 (10/31/22 1115)  Resp: (!) 30 (10/31/22 1115)  BP: (!) 90/52 (10/31/22 1115)  SpO2: 100 % (10/31/22 1115)   Vital Signs (24h Range):  Temp:  [97.6 °F (36.4 °C)-99.7 °F (37.6 °C)] 98.7 °F (37.1 °C)  Pulse:  [] 83  Resp:  [12-79] 30  SpO2:  [78 %-100 %] 100 %  BP: ()/(37-77) 90/52  Arterial Line BP: ()/(52-86) 70/66     Weight: 127 kg (279 lb 15.8 oz)  Body mass index is 35 kg/m².    Intake/Output Summary (Last 24 hours) at 10/31/2022 1213  Last data filed at 10/31/2022 1200  Gross per 24 hour   Intake 4670.78 ml   Output 1230 ml   Net 3440.78 ml      Physical Exam    Constitutional: Patient is in severe distress. Well-developed and well-nourished.  Head: Atraumatic. Normocephalic.  Eyes: PERRL. EOM intact. Conjunctivae are not pale. No scleral icterus.  ENT: dentures  Neck: Supple. Full ROM. No lymphadenopathy.  Cardiovascular: Regular rate. Regular rhythm. No murmurs, rubs, or gallops. Distal pulses are 2+ and symmetric.  Pulmonary/Chest: BVM in process  Abdominal: Soft and non-distended.  There is no tenderness.  No rebound, guarding, or rigidity.  Musculoskeletal: No edema.   Skin: Warm and dry.  Neurological:  intubated and sedated;     Significant Labs:     Results for orders placed or performed during the hospital encounter of 10/30/22   Blood culture #1 **CANNOT BE ORDERED STAT**    Specimen: Peripheral, Hand, Right; Blood   Result Value Ref Range    Blood Culture, Routine No Growth to date    Blood culture #2 **CANNOT BE ORDERED STAT**    Specimen: Peripheral, Antecubital, Right; Blood   Result Value Ref Range    Blood Culture, Routine No Growth to date    Influenza A & B by Molecular    Specimen: Nasopharyngeal Swab   Result Value Ref Range    Influenza A, Molecular Negative Negative    Influenza B, Molecular  Negative Negative    Flu A & B Source Nasal swab    Culture, Respiratory with Gram Stain    Specimen: Sputum, Expectorated; Respiratory   Result Value Ref Range    Respiratory Culture Specimen inadequate - culture not performed     Gram Stain (Respiratory) >10epis/lfp and <than many WBC's     Gram Stain (Respiratory)       Predominance of oropharyngeal mounika. Please recollect.   CBC auto differential   Result Value Ref Range    WBC 9.53 3.90 - 12.70 K/uL    RBC 4.53 (L) 4.60 - 6.20 M/uL    Hemoglobin 13.9 (L) 14.0 - 18.0 g/dL    Hematocrit 46.7 40.0 - 54.0 %     (H) 82 - 98 fL    MCH 30.7 27.0 - 31.0 pg    MCHC 29.8 (L) 32.0 - 36.0 g/dL    RDW 15.6 (H) 11.5 - 14.5 %    Platelets 206 150 - 450 K/uL    MPV 9.3 9.2 - 12.9 fL    Immature Granulocytes 0.8 (H) 0.0 - 0.5 %    Gran # (ANC) 7.8 (H) 1.8 - 7.7 K/uL    Immature Grans (Abs) 0.08 (H) 0.00 - 0.04 K/uL    Lymph # 0.7 (L) 1.0 - 4.8 K/uL    Mono # 0.8 0.3 - 1.0 K/uL    Eos # 0.1 0.0 - 0.5 K/uL    Baso # 0.03 0.00 - 0.20 K/uL    nRBC 0 0 /100 WBC    Gran % 81.5 (H) 38.0 - 73.0 %    Lymph % 7.6 (L) 18.0 - 48.0 %    Mono % 8.6 4.0 - 15.0 %    Eosinophil % 1.2 0.0 - 8.0 %    Basophil % 0.3 0.0 - 1.9 %    Differential Method Automated    Comprehensive metabolic panel   Result Value Ref Range    Sodium 144 136 - 145 mmol/L    Potassium 4.7 3.5 - 5.1 mmol/L    Chloride 99 95 - 110 mmol/L    CO2 35 (H) 23 - 29 mmol/L    Glucose 147 (H) 70 - 110 mg/dL    BUN 16 8 - 23 mg/dL    Creatinine 0.7 0.5 - 1.4 mg/dL    Calcium 9.0 8.7 - 10.5 mg/dL    Total Protein 7.6 6.0 - 8.4 g/dL    Albumin 3.1 (L) 3.5 - 5.2 g/dL    Total Bilirubin 0.3 0.1 - 1.0 mg/dL    Alkaline Phosphatase 74 55 - 135 U/L    AST 16 10 - 40 U/L    ALT 14 10 - 44 U/L    Anion Gap 10 8 - 16 mmol/L    eGFR >60 >60 mL/min/1.73 m^2   Brain natriuretic peptide   Result Value Ref Range    BNP 34 0 - 99 pg/mL   Troponin I   Result Value Ref Range    Troponin I <0.006 0.000 - 0.026 ng/mL   Lactic acid, plasma    Result Value Ref Range    Lactate (Lactic Acid) 0.7 0.5 - 2.2 mmol/L   Procalcitonin   Result Value Ref Range    Procalcitonin 0.02 <0.25 ng/mL   Urinalysis, Reflex to Urine Culture Urine, Catheterized    Specimen: Urine   Result Value Ref Range    Specimen UA Urine, Catheterized     Color, UA Yellow Yellow, Straw, Neelam    Appearance, UA Clear Clear    pH, UA 6.0 5.0 - 8.0    Specific Gravity, UA 1.020 1.005 - 1.030    Protein, UA 1+ (A) Negative    Glucose, UA Negative Negative    Ketones, UA Negative Negative    Bilirubin (UA) Negative Negative    Occult Blood UA Negative Negative    Nitrite, UA Negative Negative    Urobilinogen, UA 2.0-3.0 (A) <2.0 EU/dL    Leukocytes, UA Negative Negative   COVID-19 Rapid Screening   Result Value Ref Range    SARS-CoV-2 RNA, Amplification, Qual Positive (A) Negative   Urinalysis Microscopic   Result Value Ref Range    RBC, UA 0 0 - 4 /hpf    WBC, UA 1 0 - 5 /hpf    Bacteria None None-Occ /hpf    Hyaline Casts, UA 3 (A) 0-1/lpf /lpf    Microscopic Comment MUCUS PRESENT    Ferritin   Result Value Ref Range    Ferritin 52 20.0 - 300.0 ng/mL   C-reactive protein   Result Value Ref Range    CRP 21.1 (H) 0.0 - 8.2 mg/L   Sedimentation rate   Result Value Ref Range    Sed Rate 27 (H) 0 - 10 mm/Hr   Procalcitonin   Result Value Ref Range    Procalcitonin 0.03 <0.25 ng/mL   TSH   Result Value Ref Range    TSH 0.233 (L) 0.400 - 4.000 uIU/mL   Ammonia   Result Value Ref Range    Ammonia SEE COMMENT umol/L   T4, Free   Result Value Ref Range    Free T4 0.89 0.71 - 1.51 ng/dL   CBC Auto Differential   Result Value Ref Range    WBC 14.14 (H) 3.90 - 12.70 K/uL    RBC 4.12 (L) 4.60 - 6.20 M/uL    Hemoglobin 12.7 (L) 14.0 - 18.0 g/dL    Hematocrit 39.4 (L) 40.0 - 54.0 %    MCV 96 82 - 98 fL    MCH 30.8 27.0 - 31.0 pg    MCHC 32.2 32.0 - 36.0 g/dL    RDW 16.0 (H) 11.5 - 14.5 %    Platelets 241 150 - 450 K/uL    MPV 9.8 9.2 - 12.9 fL    Immature Granulocytes 0.5 0.0 - 0.5 %    Gran # (ANC) 10.8  (H) 1.8 - 7.7 K/uL    Immature Grans (Abs) 0.07 (H) 0.00 - 0.04 K/uL    Lymph # 2.1 1.0 - 4.8 K/uL    Mono # 1.2 (H) 0.3 - 1.0 K/uL    Eos # 0.0 0.0 - 0.5 K/uL    Baso # 0.02 0.00 - 0.20 K/uL    nRBC 0 0 /100 WBC    Gran % 76.4 (H) 38.0 - 73.0 %    Lymph % 14.6 (L) 18.0 - 48.0 %    Mono % 8.3 4.0 - 15.0 %    Eosinophil % 0.1 0.0 - 8.0 %    Basophil % 0.1 0.0 - 1.9 %    Differential Method Automated    Basic Metabolic Panel   Result Value Ref Range    Sodium 139 136 - 145 mmol/L    Potassium 4.0 3.5 - 5.1 mmol/L    Chloride 103 95 - 110 mmol/L    CO2 29 23 - 29 mmol/L    Glucose 140 (H) 70 - 110 mg/dL    BUN 20 8 - 23 mg/dL    Creatinine 0.7 0.5 - 1.4 mg/dL    Calcium 8.1 (L) 8.7 - 10.5 mg/dL    Anion Gap 7 (L) 8 - 16 mmol/L    eGFR >60 >60 mL/min/1.73 m^2   Magnesium   Result Value Ref Range    Magnesium 1.7 1.6 - 2.6 mg/dL   Procalcitonin   Result Value Ref Range    Procalcitonin 0.05 <0.25 ng/mL   Echo   Result Value Ref Range    TDI SEPTAL 0.06 m/s    LV LATERAL E/E' RATIO 5.88 m/s    LV SEPTAL E/E' RATIO 7.83 m/s    LA WIDTH 3.50 cm    Left Ventricular Outflow Tract Mean Velocity 0.61 cm/s    Left Ventricular Outflow Tract Mean Gradient 1.59 mmHg    TDI LATERAL 0.08 m/s    LVIDd 3.77 3.5 - 6.0 cm    IVS 2.10 (A) 0.6 - 1.1 cm    Posterior Wall 1.82 (A) 0.6 - 1.1 cm    Ao root annulus 4.61 cm    LVIDs 2.68 2.1 - 4.0 cm    FS 29 28 - 44 %    LA volume 44.72 cm3    Sinus 4.75 cm    STJ 4.20 cm    Ascending aorta 4.04 cm    LV mass 334.38 g    LA size 3.59 cm    RVDD 4.45 cm    Left Ventricle Relative Wall Thickness 0.97 cm    AV mean gradient 4 mmHg    AV valve area 2.73 cm2    AV Velocity Ratio 0.60     AV index (prosthetic) 0.60     MV valve area p 1/2 method 2.78 cm2    E/A ratio 1.15     Mean e' 0.07 m/s    E wave deceleration time 273.04 msec    IVRT 91.34 msec    LVOT diameter 2.40 cm    LVOT area 4.5 cm2    LVOT peak jhonny 0.71 m/s    LVOT peak VTI 13.70 cm    Ao peak jhonny 1.18 m/s    Ao VTI 22.7 cm    RVOT peak  fernando 0.73 m/s    RVOT peak VTI 11.3 cm    LVOT stroke volume 61.95 cm3    AV peak gradient 6 mmHg    PV mean gradient 0.99 mmHg    E/E' ratio 6.71 m/s    MV Peak E Fernando 0.47 m/s    TR Max Fernando 2.14 m/s    MV stenosis pressure 1/2 time 79.18 ms    MV Peak A Fernando 0.41 m/s    LV Systolic Volume 26.50 mL    LV Diastolic Volume 60.60 mL    RA Major Axis 4.43 cm    Left Atrium Minor Axis 4.08 cm    Left Atrium Major Axis 4.30 cm    Triscuspid Valve Regurgitation Peak Gradient 18 mmHg    RA Width 2.90 cm    EF 50 %   ISTAT PROCEDURE   Result Value Ref Range    POC PH 7.257 (LL) 7.35 - 7.45    POC PCO2 98.5 (HH) 35 - 45 mmHg    POC PO2 71 (L) 80 - 100 mmHg    POC HCO3 43.9 (H) 24 - 28 mmol/L    POC BE 17 -2 to 2 mmol/L    POC SATURATED O2 89 (L) 95 - 100 %    Rate 20     Sample ARTERIAL     Site RR     Allens Test Pass     DelSys Adult Vent     Mode AC/PRVC     Vt 450     PEEP 8     FiO2 100    ISTAT PROCEDURE   Result Value Ref Range    POC PH 7.408 7.35 - 7.45    POC PCO2 56.1 (HH) 35 - 45 mmHg    POC PO2 67 (L) 80 - 100 mmHg    POC HCO3 35.4 (H) 24 - 28 mmol/L    POC BE 11 -2 to 2 mmol/L    POC SATURATED O2 93 (L) 95 - 100 %    Rate 30     Sample ARTERIAL     Site RR     Allens Test Pass     DelSys Adult Vent     Mode AC/PRVC     Vt 470     PEEP 10     FiO2 100    POCT glucose   Result Value Ref Range    POCT Glucose 102 70 - 110 mg/dL   ISTAT PROCEDURE   Result Value Ref Range    POC PH 7.475 (H) 7.35 - 7.45    POC PCO2 44.3 35 - 45 mmHg    POC PO2 54 (LL) 80 - 100 mmHg    POC HCO3 32.6 (H) 24 - 28 mmol/L    POC BE 9 -2 to 2 mmol/L    POC SATURATED O2 90 (L) 95 - 100 %    Rate 30     Sample ARTERIAL     Site LR     Allens Test Pass     DelSys Adult Vent     Mode AC/PRVC     Vt 470     PEEP 10     FiO2 100    POCT glucose   Result Value Ref Range    POCT Glucose 133 (H) 70 - 110 mg/dL   ISTAT PROCEDURE   Result Value Ref Range    POC PH 7.451 (H) 7.35 - 7.45    POC PCO2 45.2 (H) 35 - 45 mmHg    POC PO2 99 80 - 100 mmHg     POC HCO3 31.5 (H) 24 - 28 mmol/L    POC BE 8 -2 to 2 mmol/L    POC SATURATED O2 98 95 - 100 %    Rate 30     Sample ARTERIAL     Site Shereen/UAC     Allens Test N/A     DelSys Adult Vent     Mode AC/PRVC     Vt 470     PEEP 10     FiO2 90    POCT glucose   Result Value Ref Range    POCT Glucose 116 (H) 70 - 110 mg/dL   POCT glucose   Result Value Ref Range    POCT Glucose 110 70 - 110 mg/dL        Significant Imaging:     Imaging Results              CTA Chest Non-Coronary (PE Studies) (Final result)  Result time 10/30/22 13:44:24      Final result by Raheem Hendrickson MD (10/30/22 13:44:24)                   Impression:      1. No pulmonary embolism.  2. Mucous plug in the right mainstem bronchus with total atelectasis of the right middle lobe and right lower lobe.  3. Marked dependent atelectasis in the basilar segments left lower lobe and dependent atelectasis in the right upper lobe.  4. Extensive calcific atherosclerotic disease of the coronary arteries.  All CT scans at this facility are performed  using dose modulation techniques as appropriate to performed exam including the following:  automated exposure control; adjustment of mA and/or kV according to the patients size (this includes techniques or standardized protocols for targeted exams where dose is matched to indication/reason for exam: i.e. extremities or head);  iterative reconstruction technique.      Electronically signed by: Raheem Hendrickson MD  Date:    10/30/2022  Time:    13:44               Narrative:    EXAMINATION:  CTA CHEST NON CORONARY (PE STUDIES)    CLINICAL HISTORY:  Pulmonary embolism (PE) suspected, high prob;    TECHNIQUE:  Axial CTA images performed through the chest after the administration of 100 cc intravenous contrast. 3D MIP images were performed and interpreted.    COMPARISON:  None    FINDINGS:  No filling defects in the pulmonary arteries to indicate a pulmonary embolism.  Calcific atherosclerotic disease of the coronary arteries.   No pericardial effusion.  NG tube tip in the fundus of the stomach.  Right IJ central venous catheter tip in the SVC.    There is a mucous plug in the right mainstem bronchus with total atelectasis of the right middle lobe and right lower lobe.  There is dependent atelectasis in the right upper lobe.  There is marked atelectasis in the basilar segments of the left lower lobe.  No pleural effusion or pneumothorax.    No acute osseous abnormality.                                       X-Ray Chest 1 View (Final result)  Result time 10/30/22 11:06:21      Final result by Raheem Hendrickson MD (10/30/22 11:06:21)                   Impression:      See above      Electronically signed by: Raheem Hendrickson MD  Date:    10/30/2022  Time:    11:06               Narrative:    EXAMINATION:  XR CHEST 1 VIEW    CLINICAL HISTORY:  Central line plcmnt;    COMPARISON:  Chest x-ray performed less than 2 hours ago    FINDINGS:  Endotracheal tube in good position.  NG tube tip in the fundus of the stomach.  Newly placed right IJ central venous catheter tip in the SVC.  No pneumothorax.  Right middle lobe and right lower lobe total atelectasis.  Marked left basilar atelectasis.                                       CT Head Without Contrast (Final result)  Result time 10/30/22 09:52:01      Final result by Raheem Hendrickson MD (10/30/22 09:52:01)                   Impression:      1. No acute intracranial process.  2. 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  3. Fluid filling the right middle ear cavity which can be seen with otitis media.  4. Paranasal sinus disease.  All CT scans at this facility are performed  using dose modulation techniques as appropriate to performed exam including the following:  automated exposure control; adjustment of mA and/or kV according to the patients size (this includes techniques or standardized protocols for targeted exams where dose is matched  to indication/reason for exam: i.e. extremities or head);  iterative reconstruction technique.      Electronically signed by: Raheem Hendrickson MD  Date:    10/30/2022  Time:    09:52               Narrative:    EXAMINATION:  CT HEAD WITHOUT CONTRAST    CLINICAL HISTORY:  Mental status change, unknown cause;    TECHNIQUE:  Axial CT imaging was performed through the head without intravenous contrast.    COMPARISON:  None    FINDINGS:  No hydrocephalus, midline shift, mass effect, or acute intracranial hemorrhage. Chronic small vessel ischemic change of the white matter.  Old lacunar infarcts in the left caudate nucleus head and body.  Mucosal thickening of the paranasal sinuses.  Fluid filling the right middle ear cavity..  The skull is intact.                                       X-Ray Chest AP Portable (Final result)  Result time 10/30/22 09:26:02      Final result by Raheem Hendrickson MD (10/30/22 09:26:02)                   Impression:      See above      Electronically signed by: Raheem Hendrickson MD  Date:    10/30/2022  Time:    09:26               Narrative:    EXAMINATION:  XR CHEST AP PORTABLE    CLINICAL HISTORY:  Presence of other specified devices    COMPARISON:  None.    FINDINGS:  Endotracheal tube in good position.  The NG tube courses down the esophagus with its tip in the fundus of the stomach.  There are markedly elevated hemidiaphragms with marked low lung volumes.  Right middle lobe and right lower lobe total atelectasis with a cut off sign of the right mainstem bronchus.  Marked left basilar atelectasis.  No pneumothorax.

## 2022-10-31 NOTE — CONSULTS
[facility] - Intensive Care ([facility])  Adult Nutrition  Consult Note    SUMMARY     Recommendations  1. Recommend when medically appropriate, initiate pt onto EN feedings.   - Peptamen Intevse VHP, goal rate 80mL/hr.   - Povides w/ propofol 2524.56kcal (99% EEN), 176.64g Protein, 1612.8mL H20.   - 150mL water flushes q4hrs or per MD/NP.   - Check Mg, Na, K+, Phos, Glu before and during initiation, correct as indicated.    Goals:   1. Pt will be initiated onto EN feeding within 24-48hrs.   2. Pt will tolerate >60% of energy needs by RD follow up  Nutrition Goal Status: new  Communication of RD Recs: other (comment) (POC: Sticky note)    Assessment and Plan  Nutrition Problem  Inadequate PO intake    Related to (etiology):   Decreased ability to consume sufficient nutrients.      Signs and Symptoms (as evidenced by):   NPO, Vent    Interventions(treatment strategy):  1. Recommend when medically appropriate, initiate pt onto EN feedings.   - Peptamen Intevse VHP, goal rate 80mL/hr.   - Provides w/ propofol 2524.56kcal (99% EEN), 176.64g Protein, 1612.8mL H20.   - 150mL water flushes q4hrs or per MD/NP.   - Check Mg, Na, K+, Phos, Glu before and during initiation, correct as indicated.  2. Collaboration of care with medical provider.    Nutrition Diagnosis Status:   New         Reason for Assessment    Reason For Assessment: consult  Diagnosis: pulmonary disease  Relevant Medical History: HTN, HLD, Vent, BMI: 35  General Information Comments:   10/31: 72 y.o. male, RD consulted d/t resp failure. Pt has PMH of HTN and HLD. Pt has current BMI of 35. Pt currently is intubate and sedated. Currently on propofol 22.9 providing 604.56kcal, Total Ve 14.5. Current abnormal nutrition labs: Glu 140, Ca 8.1. Per EMR, pt urine output is improved. Pt I/O: +2732 since admit. Will continue to monitor.  Nutrition Discharge Planning: Pending on medical treatment: Cardiac diet    Nutrition Risk Screen    Nutrition Risk Screen: tube feeding  "or parenteral nutrition    Nutrition/Diet History    Spiritual, Cultural Beliefs, Christianity Practices, Values that Affect Care: no  Food Allergies: NKFA  Factors Affecting Nutritional Intake: on mechanical ventilation, NPO    Anthropometrics    Temp: 98 °F (36.7 °C)  Height Method: Estimated  Height: 6' 3" (190.5 cm)  Height (inches): 75 in  Weight Method: Bed Scale  Weight: 127 kg (279 lb 15.8 oz)  Weight (lb): 279.99 lb  Ideal Body Weight (IBW), Male: 196 lb  % Ideal Body Weight, Male (lb): 142.85 %  BMI (Calculated): 35  BMI Grade: 35 - 39.9 - obesity - grade II       Lab/Procedures/Meds  BMP  Lab Results   Component Value Date     10/31/2022    K 4.0 10/31/2022     10/31/2022    CO2 29 10/31/2022    BUN 20 10/31/2022    CREATININE 0.7 10/31/2022    CALCIUM 8.1 (L) 10/31/2022    ANIONGAP 7 (L) 10/31/2022      Pertinent Labs Reviewed: reviewed  Pertinent Labs Comments: Glu 140, Ca 8.1  Pertinent Medications Reviewed: reviewed  Scheduled Meds:   acetylcysteine 100 mg/ml (10%)  4 mL Nebulization TID    albuterol sulfate  2.5 mg Nebulization Q8H    apixaban  5 mg Per OG tube BID    ascorbic acid (vitamin C)  1,000 mg Per OG tube Daily    ceFEPime (MAXIPIME) IVPB  2 g Intravenous Q8H    chlorhexidine  15 mL Mouth/Throat BID    dexAMETHasone  6 mg Per OG tube Daily    docusate  100 mg Per NG tube Daily    famotidine  20 mg Per OG tube BID    mupirocin   Nasal BID    polyethylene glycol  17 g Per NG tube Daily    remdesivir infusion  100 mg Intravenous Daily    sodium chloride 3%  4 mL Nebulization Q8H    zinc sulfate  220 mg Per OG tube Daily     Continuous Infusions:   sodium chloride 0.9% 100 mL/hr at 10/31/22 0600    fentanyl 50 mcg/hr (10/31/22 0600)    NORepinephrine bitartrate-D5W 0.02 mcg/kg/min (10/31/22 0939)    propofoL 30 mcg/kg/min (10/31/22 0741)     PRN Meds:.acetaminophen, calcium gluconate IVPB, calcium gluconate IVPB, calcium gluconate IVPB, dextrose 10%, dextrose 10%, glucagon (human " recombinant), influenza 65up-adj, insulin aspart U-100, magnesium sulfate IVPB, magnesium sulfate IVPB, morphine, ondansetron, pneumoc 20-slime conj-dip cr(PF), potassium chloride in water **AND** potassium chloride in water, sodium chloride 0.9%, sodium chloride 0.9%, sodium phosphate IVPB, sodium phosphate IVPB, sodium phosphate IVPB   Physical Findings/Assessment         Estimated/Assessed Needs    Weight Used For Calorie Calculations: 127 kg (279 lb 15.8 oz)  Energy Calorie Requirements (kcal): 2534  Energy Need Method: [place] (modified)  Protein Requirements: 178 (IBW used d/t BMI > 30, on Vent, 2g/kg)  Weight Used For Protein Calculations: 89.1 kg (196 lb 6.5 oz)  Fluid Requirements (mL): 2534  Estimated Fluid Requirement Method: RDA Method  RDA Method (mL): 2534  CHO Requirement: 316      Nutrition Prescription Ordered    Current Diet Order: NPO    Evaluation of Received Nutrient/Fluid Intake    % Kcal Needs: 0  % Protein Needs: 0  Total Fluid Intake (mL): 3452  I/O: +2732  Energy Calories Required: not meeting needs  Protein Required: not meeting needs  Fluid Required: meeting needs  % Intake of Estimated Energy Needs: 0 - 25 %  % Meal Intake: NPO    Nutrition Risk    Level of Risk/Frequency of Follow-up: high       Monitor and Evaluation    Food and Nutrient Intake: energy intake, enteral nutrition intake, food and beverage intake  Food and Nutrient Adminstration: diet order, enteral and parenteral nutrition administration  Knowledge/Beliefs/Attitudes: food and nutrition knowledge/skill, beliefs and attitudes  Anthropometric Measurements: weight, weight change, body mass index  Biochemical Data, Medical Tests and Procedures: electrolyte and renal panel, gastrointestinal profile, glucose/endocrine profile, inflammatory profile, lipid profile  Nutrition-Focused Physical Findings: overall appearance       Nutrition Follow-Up    RD Follow-up?: Yes  Gail Aliceaitijohn paul

## 2022-10-31 NOTE — HOSPITAL COURSE
10/30:  Intubated.  Broad Abx.  10/31:  Remains on vent.  Sedated.  Significant atelectasis on CXR. Bronch for airway clearance. Cotninue CFP.  Discontinue vanco, Flagyl.  Resume Eliquis.  11/1:    Initally intubated and sedated on Propofol.  Subsequently extubated.  Continue CFP.  Cultures remain pending.  Continue eliquis.  Was on norepi @ 0.02 this morning; however that has since been weaned off.   Cr 0.6.  I/O net +957 over prior 24h.  Give lasix 20mg IV x1 for volume management; however low threshold to give fluid back if hypotensive again.  11/2:   Remains extubated and off norepi.  Cr 0.6, I/O net -2.479L over last 24h.  Giove lasix 20 IV x1 again today.  Start metoprolol XL, norvasc for HTN.  PT, OT.

## 2022-10-31 NOTE — CHAPLAIN
Initial visit with patient.  Attempted visit with patient.  Pt was on vent but I did leave a card with a note for family and will follow up as needed.    Chaplain Turner Garner M.Div., [facility]

## 2022-10-31 NOTE — ASSESSMENT & PLAN NOTE
Bronchodilators  hypersal nebs  Bronch 10/31 with mucus plug cleared in RLL.  Scattered mucus suctioned in bilat lungs.  Incentive spirometer

## 2022-10-31 NOTE — PLAN OF CARE
Interventions(treatment strategy):  1. Recommend when medically appropriate, initiate pt onto EN feedings.   - Peptamen Intevse VHP, goal rate 80mL/hr.   - Provides w/ propofol 2524.56kcal (99% EEN), 176.64g Protein, 1612.8mL H20.   - 150mL water flushes q4hrs or per MD/NP.   - Check Mg, Na, K+, Phos, Glu before and during initiation, correct as indicated.  2. Collaboration of care with medical provider.    Tamir Huggins, Dietitian

## 2022-11-01 LAB
ALLENS TEST: ABNORMAL
ALLENS TEST: ABNORMAL
ANION GAP SERPL CALC-SCNC: 9 MMOL/L (ref 8–16)
BASOPHILS # BLD AUTO: 0.01 K/UL (ref 0–0.2)
BASOPHILS NFR BLD: 0.1 % (ref 0–1.9)
BUN SERPL-MCNC: 14 MG/DL (ref 8–23)
CALCIUM SERPL-MCNC: 8.1 MG/DL (ref 8.7–10.5)
CHLORIDE SERPL-SCNC: 107 MMOL/L (ref 95–110)
CO2 SERPL-SCNC: 27 MMOL/L (ref 23–29)
CREAT SERPL-MCNC: 0.6 MG/DL (ref 0.5–1.4)
DELSYS: ABNORMAL
DELSYS: ABNORMAL
DIFFERENTIAL METHOD: ABNORMAL
EOSINOPHIL # BLD AUTO: 0 K/UL (ref 0–0.5)
EOSINOPHIL NFR BLD: 0.1 % (ref 0–8)
ERYTHROCYTE [DISTWIDTH] IN BLOOD BY AUTOMATED COUNT: 16.5 % (ref 11.5–14.5)
ERYTHROCYTE [SEDIMENTATION RATE] IN BLOOD BY WESTERGREN METHOD: 24 MM/H
ERYTHROCYTE [SEDIMENTATION RATE] IN BLOOD BY WESTERGREN METHOD: 30 MM/H
EST. GFR  (NO RACE VARIABLE): >60 ML/MIN/1.73 M^2
FIO2: 60
FIO2: 65
GLUCOSE SERPL-MCNC: 107 MG/DL (ref 70–110)
HCO3 UR-SCNC: 30.2 MMOL/L (ref 24–28)
HCO3 UR-SCNC: 30.6 MMOL/L (ref 24–28)
HCT VFR BLD AUTO: 38 % (ref 40–54)
HGB BLD-MCNC: 11.9 G/DL (ref 14–18)
IMM GRANULOCYTES # BLD AUTO: 0.05 K/UL (ref 0–0.04)
IMM GRANULOCYTES NFR BLD AUTO: 0.4 % (ref 0–0.5)
LYMPHOCYTES # BLD AUTO: 1.6 K/UL (ref 1–4.8)
LYMPHOCYTES NFR BLD: 13.5 % (ref 18–48)
MAGNESIUM SERPL-MCNC: 2.1 MG/DL (ref 1.6–2.6)
MCH RBC QN AUTO: 29.9 PG (ref 27–31)
MCHC RBC AUTO-ENTMCNC: 31.3 G/DL (ref 32–36)
MCV RBC AUTO: 96 FL (ref 82–98)
MODE: ABNORMAL
MODE: ABNORMAL
MONOCYTES # BLD AUTO: 0.9 K/UL (ref 0.3–1)
MONOCYTES NFR BLD: 7.6 % (ref 4–15)
NEUTROPHILS # BLD AUTO: 9.1 K/UL (ref 1.8–7.7)
NEUTROPHILS NFR BLD: 78.3 % (ref 38–73)
NRBC BLD-RTO: 0 /100 WBC
PCO2 BLDA: 39.4 MMHG (ref 35–45)
PCO2 BLDA: 47.2 MMHG (ref 35–45)
PEEP: 10
PEEP: 8
PH SMN: 7.41 [PH] (ref 7.35–7.45)
PH SMN: 7.5 [PH] (ref 7.35–7.45)
PLATELET # BLD AUTO: 237 K/UL (ref 150–450)
PMV BLD AUTO: 9.8 FL (ref 9.2–12.9)
PO2 BLDA: 82 MMHG (ref 80–100)
PO2 BLDA: 88 MMHG (ref 80–100)
POC BE: 6 MMOL/L
POC BE: 7 MMOL/L
POC SATURATED O2: 96 % (ref 95–100)
POC SATURATED O2: 98 % (ref 95–100)
POCT GLUCOSE: 106 MG/DL (ref 70–110)
POCT GLUCOSE: 106 MG/DL (ref 70–110)
POCT GLUCOSE: 110 MG/DL (ref 70–110)
POTASSIUM SERPL-SCNC: 3.7 MMOL/L (ref 3.5–5.1)
RBC # BLD AUTO: 3.98 M/UL (ref 4.6–6.2)
SAMPLE: ABNORMAL
SAMPLE: ABNORMAL
SITE: ABNORMAL
SITE: ABNORMAL
SODIUM SERPL-SCNC: 143 MMOL/L (ref 136–145)
VT: 470
VT: 470
WBC # BLD AUTO: 11.66 K/UL (ref 3.9–12.7)

## 2022-11-01 PROCEDURE — 20000000 HC ICU ROOM

## 2022-11-01 PROCEDURE — 94761 N-INVAS EAR/PLS OXIMETRY MLT: CPT

## 2022-11-01 PROCEDURE — 94660 CPAP INITIATION&MGMT: CPT

## 2022-11-01 PROCEDURE — 63600175 PHARM REV CODE 636 W HCPCS: Performed by: STUDENT IN AN ORGANIZED HEALTH CARE EDUCATION/TRAINING PROGRAM

## 2022-11-01 PROCEDURE — 27000221 HC OXYGEN, UP TO 24 HOURS

## 2022-11-01 PROCEDURE — 80048 BASIC METABOLIC PNL TOTAL CA: CPT | Performed by: INTERNAL MEDICINE

## 2022-11-01 PROCEDURE — 25000003 PHARM REV CODE 250: Performed by: INTERNAL MEDICINE

## 2022-11-01 PROCEDURE — 99900026 HC AIRWAY MAINTENANCE (STAT)

## 2022-11-01 PROCEDURE — 83735 ASSAY OF MAGNESIUM: CPT | Performed by: INTERNAL MEDICINE

## 2022-11-01 PROCEDURE — 36600 WITHDRAWAL OF ARTERIAL BLOOD: CPT

## 2022-11-01 PROCEDURE — 82803 BLOOD GASES ANY COMBINATION: CPT

## 2022-11-01 PROCEDURE — 25000242 PHARM REV CODE 250 ALT 637 W/ HCPCS: Performed by: INTERNAL MEDICINE

## 2022-11-01 PROCEDURE — 27000207 HC ISOLATION

## 2022-11-01 PROCEDURE — 99291 PR CRITICAL CARE, E/M 30-74 MINUTES: ICD-10-PCS | Mod: ,,, | Performed by: INTERNAL MEDICINE

## 2022-11-01 PROCEDURE — 63600175 PHARM REV CODE 636 W HCPCS: Performed by: INTERNAL MEDICINE

## 2022-11-01 PROCEDURE — 94668 MNPJ CHEST WALL SBSQ: CPT

## 2022-11-01 PROCEDURE — 82800 BLOOD PH: CPT

## 2022-11-01 PROCEDURE — 94640 AIRWAY INHALATION TREATMENT: CPT

## 2022-11-01 PROCEDURE — 27000190 HC CPAP FULL FACE MASK W/VALVE

## 2022-11-01 PROCEDURE — 94003 VENT MGMT INPAT SUBQ DAY: CPT

## 2022-11-01 PROCEDURE — 25000242 PHARM REV CODE 250 ALT 637 W/ HCPCS: Performed by: STUDENT IN AN ORGANIZED HEALTH CARE EDUCATION/TRAINING PROGRAM

## 2022-11-01 PROCEDURE — 99900035 HC TECH TIME PER 15 MIN (STAT)

## 2022-11-01 PROCEDURE — 99291 CRITICAL CARE FIRST HOUR: CPT | Mod: ,,, | Performed by: INTERNAL MEDICINE

## 2022-11-01 PROCEDURE — 94799 UNLISTED PULMONARY SVC/PX: CPT

## 2022-11-01 PROCEDURE — 85025 COMPLETE CBC W/AUTO DIFF WBC: CPT | Performed by: INTERNAL MEDICINE

## 2022-11-01 PROCEDURE — 25000003 PHARM REV CODE 250: Performed by: STUDENT IN AN ORGANIZED HEALTH CARE EDUCATION/TRAINING PROGRAM

## 2022-11-01 RX ORDER — ALBUTEROL SULFATE 90 UG/1
2 AEROSOL, METERED RESPIRATORY (INHALATION) EVERY 4 HOURS PRN
Status: DISCONTINUED | OUTPATIENT
Start: 2022-11-01 | End: 2022-11-03 | Stop reason: HOSPADM

## 2022-11-01 RX ORDER — LABETALOL HYDROCHLORIDE 5 MG/ML
10 INJECTION, SOLUTION INTRAVENOUS EVERY 4 HOURS PRN
Status: DISCONTINUED | OUTPATIENT
Start: 2022-11-01 | End: 2022-11-03 | Stop reason: HOSPADM

## 2022-11-01 RX ORDER — FUROSEMIDE 10 MG/ML
20 INJECTION INTRAMUSCULAR; INTRAVENOUS ONCE
Status: COMPLETED | OUTPATIENT
Start: 2022-11-01 | End: 2022-11-01

## 2022-11-01 RX ORDER — HYDRALAZINE HYDROCHLORIDE 20 MG/ML
10 INJECTION INTRAMUSCULAR; INTRAVENOUS EVERY 4 HOURS PRN
Status: DISCONTINUED | OUTPATIENT
Start: 2022-11-01 | End: 2022-11-03 | Stop reason: HOSPADM

## 2022-11-01 RX ADMIN — REMDESIVIR 100 MG: 100 INJECTION, POWDER, LYOPHILIZED, FOR SOLUTION INTRAVENOUS at 08:11

## 2022-11-01 RX ADMIN — MUPIROCIN: 20 OINTMENT TOPICAL at 08:11

## 2022-11-01 RX ADMIN — CEFEPIME 2 G: 2 INJECTION, POWDER, FOR SOLUTION INTRAVENOUS at 07:11

## 2022-11-01 RX ADMIN — FAMOTIDINE 20 MG: 40 POWDER, FOR SUSPENSION ORAL at 08:11

## 2022-11-01 RX ADMIN — CEFEPIME 2 G: 2 INJECTION, POWDER, FOR SOLUTION INTRAVENOUS at 10:11

## 2022-11-01 RX ADMIN — APIXABAN 5 MG: 2.5 TABLET, FILM COATED ORAL at 08:11

## 2022-11-01 RX ADMIN — OXYCODONE HYDROCHLORIDE AND ACETAMINOPHEN 1000 MG: 500 TABLET ORAL at 08:11

## 2022-11-01 RX ADMIN — CHLORHEXIDINE GLUCONATE 0.12% ORAL RINSE 15 ML: 1.2 LIQUID ORAL at 08:11

## 2022-11-01 RX ADMIN — DEXAMETHASONE 6 MG: 4 TABLET ORAL at 08:11

## 2022-11-01 RX ADMIN — POLYETHYLENE GLYCOL 3350 17 G: 17 POWDER, FOR SOLUTION ORAL at 08:11

## 2022-11-01 RX ADMIN — SODIUM CHLORIDE: 9 INJECTION, SOLUTION INTRAVENOUS at 04:11

## 2022-11-01 RX ADMIN — PROPOFOL 45 MCG/KG/MIN: 10 INJECTION, EMULSION INTRAVENOUS at 07:11

## 2022-11-01 RX ADMIN — POTASSIUM CHLORIDE 40 MEQ: 29.8 INJECTION, SOLUTION INTRAVENOUS at 06:11

## 2022-11-01 RX ADMIN — SODIUM CHLORIDE 30 MG/ML INHALATION SOLUTION 4 ML: 30 SOLUTION INHALANT at 11:11

## 2022-11-01 RX ADMIN — PROPOFOL 40 MCG/KG/MIN: 10 INJECTION, EMULSION INTRAVENOUS at 05:11

## 2022-11-01 RX ADMIN — SODIUM CHLORIDE 30 MG/ML INHALATION SOLUTION 4 ML: 30 SOLUTION INHALANT at 07:11

## 2022-11-01 RX ADMIN — LABETALOL HYDROCHLORIDE 10 MG: 5 INJECTION INTRAVENOUS at 12:11

## 2022-11-01 RX ADMIN — PROPOFOL 40 MCG/KG/MIN: 10 INJECTION, EMULSION INTRAVENOUS at 01:11

## 2022-11-01 RX ADMIN — ZINC SULFATE 220 MG (50 MG) CAPSULE 220 MG: CAPSULE at 08:11

## 2022-11-01 RX ADMIN — SODIUM CHLORIDE 30 MG/ML INHALATION SOLUTION 4 ML: 30 SOLUTION INHALANT at 04:11

## 2022-11-01 RX ADMIN — SODIUM CHLORIDE: 9 INJECTION, SOLUTION INTRAVENOUS at 01:11

## 2022-11-01 RX ADMIN — DOCUSATE SODIUM 100 MG: 50 LIQUID ORAL at 08:11

## 2022-11-01 RX ADMIN — FUROSEMIDE 20 MG: 10 INJECTION, SOLUTION INTRAMUSCULAR; INTRAVENOUS at 11:11

## 2022-11-01 RX ADMIN — CEFEPIME 2 G: 2 INJECTION, POWDER, FOR SOLUTION INTRAVENOUS at 04:11

## 2022-11-01 RX ADMIN — Medication 100 MCG/HR: at 07:11

## 2022-11-01 RX ADMIN — ALBUTEROL SULFATE 2.5 MG: 2.5 SOLUTION RESPIRATORY (INHALATION) at 07:11

## 2022-11-01 NOTE — PROGRESS NOTES
[facility] - Intensive Care ([facility])  Critical Care Medicine  Progress Note    Patient Name: Brady Haines  MRN: [id number]  Admission Date: 10/30/2022  Hospital Length of Stay: 2 days  Code Status: Full Code  Attending Provider: Guerline Arevalo, *  Primary Care Provider: Mickie Hui MD   Principal Problem: <principal problem not specified>    Subjective:     HPI:  Brady Haines is 72 y.o.  Seen in ER  Spouse at bedside  Brought from [facility] found down< Bagged and intubated in ER  Unresponsive, Hypothermia, elevated pCO2  Unable to intubate in field  Got rocoronium + etomidate  pCO2 was 98  Never smoker, deny COPD, marie,   HX long hospitalization May 2022: [facility]: Spine Abcess: Staphy and acinetobacter  Was in NH  Unable to do PT due to Knee issues  T max: 95.1 F      Meds reveiwed: on Eliquis      No past medical history on file.     Hospital/ICU Course:  10/30:  Intubated.  Broad Abx.  10/31:  Remains on vent.  Sedated.  Significant atelectasis on CXR. Bronch for airway clearance. Cotninue CFP.  Discontinue vanco, Flagyl.  Resume Eliquis.  11/1:    Remains intubated and sedated on Propofol.  SAT/SBT.  Doing well at this time.  Possible extubation.  Continue CFP.  Cultures remain pending.  Continue eliquis.  Was on norepi @ 0.02 this morning; however that has since been weaned off.   Cr 0.6.  I/O net +957 over prior 24h.  Give lasix 20mg IV x1 for volume management; however low threshold to give fluid back if hypotensive again.    Interval History:     Unable to obtain interval history or ROS from patient due to intubation and sedation.      Objective:     Vital Signs (Most Recent):  Temp: 97.1 °F (36.2 °C) (11/01/22 0715)  Pulse: 108 (11/01/22 1030)  Resp: 13 (11/01/22 1030)  BP: (!) 163/93 (11/01/22 1030)  SpO2: 97 % (11/01/22 1030)   Vital Signs (24h Range):  Temp:  [97.1 °F (36.2 °C)-98.9 °F (37.2 °C)] 97.1 °F (36.2 °C)  Pulse:  [] 108  Resp:  [12-31] 13  SpO2:  [97  %-100 %] 97 %  BP: ()/(50-93) 163/93  Arterial Line BP: (70)/(66) 70/66     Weight: 126.1 kg (278 lb)  Body mass index is 34.75 kg/m².      Intake/Output Summary (Last 24 hours) at 11/1/2022 1108  Last data filed at 11/1/2022 1000  Gross per 24 hour   Intake 3469.79 ml   Output 2620 ml   Net 849.79 ml       Physical Exam  Vitals reviewed.   Constitutional:       Interventions: He is sedated and intubated.   HENT:      Head: Normocephalic and atraumatic.      Nose: Nose normal.   Eyes:      Pupils: Pupils are equal, round, and reactive to light.   Cardiovascular:      Rate and Rhythm: Normal rate and regular rhythm.   Pulmonary:      Effort: Pulmonary effort is normal. No respiratory distress. He is intubated.      Comments: Symmetric chest rise.  No dyssynchrony or distress on the vent.  Abdominal:      General: There is no distension.      Palpations: Abdomen is soft.   Musculoskeletal:         General: No deformity or signs of injury.      Right lower leg: No edema.      Left lower leg: No edema.   Skin:     General: Skin is warm and dry.   Neurological:      General: No focal deficit present.      Mental Status: He is oriented to person, place, and time.       Vents:  Vent Mode: A/C (11/01/22 0935)  Ventilator Initiated: Yes (10/30/22 0846)  Set Rate: 22 BPM (11/01/22 0935)  Vt Set: 470 mL (11/01/22 0935)  Pressure Support: 0 cmH20 (11/01/22 0935)  PEEP/CPAP: 8 cmH20 (11/01/22 0935)  Oxygen Concentration (%): 50 (11/01/22 1030)  Peak Airway Pressure: 33 cmH2O (11/01/22 0935)  Plateau Pressure: 20 cmH20 (11/01/22 0935)  Total Ve: 12.3 L/m (11/01/22 0935)  F/VT Ratio<105 (RSBI): (!) 36.42 (11/01/22 0935)    Lines/Drains/Airways       Central Venous Catheter Line  Duration             Percutaneous Central Line Insertion/Assessment - Triple Lumen  10/30/22 1051 right internal jugular 2 days              Drain  Duration                  NG/OG Tube 10/30/22 0910 Alma sump 16 Fr. Left mouth 2 days          Urethral Catheter 10/30/22 1000 16 Fr. 2 days              Airway  Duration                  Airway - Non-Surgical 10/30/22 0842 2 days              Peripheral Intravenous Line  Duration                  Peripheral IV - Single Lumen 10/30/22 0845 20 G Left Hand 2 days         Peripheral IV - Single Lumen 10/30/22 1105 20 G Right Antecubital 2 days                    Significant Labs:    CBC/Anemia Profile:  Recent Labs   Lab 10/30/22  1402 10/31/22  0450 11/01/22  0417   WBC  --  14.14* 11.66   HGB  --  12.7* 11.9*   HCT  --  39.4* 38.0*   PLT  --  241 237   MCV  --  96 96   RDW  --  16.0* 16.5*   FERRITIN 52  --   --         Chemistries:  Recent Labs   Lab 10/31/22  0450 11/01/22  0417    143   K 4.0 3.7    107   CO2 29 27   BUN 20 14   CREATININE 0.7 0.6   CALCIUM 8.1* 8.1*   MG 1.7 2.1       A1C: No results for input(s): HGBA1C in the last 48 hours.  Blood Culture:   Recent Labs   Lab 10/30/22  1207   LABBLOO No Growth to date  No Growth to date     Coagulation: No results for input(s): PT, INR, APTT in the last 48 hours.  Lactic Acid:   No results for input(s): LACTATE in the last 48 hours.    Pathology Results  (Last 10 years)      None          POCT Glucose:   Recent Labs   Lab 10/31/22  1812 10/31/22  2317 11/01/22  0528   POCTGLUCOSE 141* 128* 106     Respiratory Culture:   Recent Labs   Lab 10/30/22  1600   GSRESP >10epis/lfp and <than many WBC's  Predominance of oropharyngeal mounika. Please recollect.   RESPIRATORYC Specimen inadequate - culture not performed     Troponin:   No results for input(s): TROPONINI in the last 48 hours.    Urine Culture: No results for input(s): LABURIN in the last 48 hours.  Urine Studies:   No results for input(s): COLORU, APPEARANCEUA, PHUR, SPECGRAV, PROTEINUA, GLUCUA, KETONESU, BILIRUBINUA, OCCULTUA, NITRITE, UROBILINOGEN, LEUKOCYTESUR, RBCUA, WBCUA, BACTERIA, SQUAMEPITHEL, HYALINECASTS in the last 48 hours.    Invalid input(s): PHILIP Duong  Imaging:  I have reviewed all pertinent imaging results/findings within the past 24 hours.      ABG  Recent Labs   Lab 11/01/22  0825   PH 7.414   PO2 82   PCO2 47.2*   HCO3 30.2*   BE 6       Assessment/Plan:     Pulmonary  Right middle lobe pneumonia  Sputum culture  Possible aspiration  Abx: CEFEPIME  Chest CT reviewed  Blood cultures    On mechanically assisted ventilation  Pulmonary:  Continue ventilator support.            Ventilator Data (Last 24H):     Vent Mode: A/C  Oxygen Concentration (%):  [] 80  Resp Rate Total:  [30 br/min] 30 br/min  Vt Set:  [470 mL] 470 mL  PEEP/CPAP:  [10 cmH20] 10 cmH20  Pressure Support:  [0 cmH20] 0 cmH20  Mean Airway Pressure:  [18 daO88-13 cmH20] 19 cmH20      Ventilator settings reviewed and adjusted to optimize gas exchange.      Proceed with weaning trials on a support/weaning mode on the ventilator when the following goals are met:  Patient Criteria:  - Spontaneous breathing in CPAP  - Able to lift head off pillow  - Hemodynamic stability  - HR: 60 to 110 bpm  - ABP: greater than 90/50  - RR: 10 to 28 bpm  - Secretion management/adequate cough  - Acceptable arterial blood gases     Ventilator Criteria:  - FiO2: less than or equal to 40%  - PEEP less than or equal to 8 to 10 cm H2O     Bronchodilators per protocol.    Sedation: Fentanyl + Propofol    Serial ABG    AM CXR    Tube feeds    Miralax, Colace    Sliding scale    On ELIQUIS    Atelectasis  Bronchodilators  hypersal nebs  Bronch 10/31 with mucus plug cleared in RLL.  Scattered mucus suctioned in bilat lungs.    Acute respiratory failure with hypoxia  Acute hypercapnic and hypoxemic respiratory failure  Uses home oxygen: No  Signs & symptoms of acute resp failure:  increased work of breathing, lethargy and cyanosis.  Recent ABG reviewed: Yes  Contributing diagnoses: pneumonia, atelectasis    Plan:  -Continue vent and adjust as needed  -Abx  -Hypertonic saline  nebs  -CPT          Cardiac/Vascular  Hypertension  Home meds: NORVASC, LOSARTAN.    Hyperlipidemia  MED: LIPITOR    ID  COVID-19 virus antibody detected  Patient is vaccinated    COVID risk score 6     REMDESIVIR  DEXAMETHASONE  ZINC  Vit C  resp path PCR               Hematology  Acute deep vein thrombosis (DVT) of tibial vein of both lower extremities  Continue home eliquis    GI  Gastroesophageal reflux disease without esophagitis  PEPCID         Critical Care Daily Checklist:    A: Awake: RASS Goal/Actual Goal: RASS Goal: -2-->light sedation  Actual: Fleming Agitation Sedation Scale (RASS): Light sedation   B: Spontaneous Breathing Trial Performed? Spon. Breathing Trial Initiated?: Initiated (11/01/22 1000)   C: SAT & SBT Coordinated?  Yes, as able per criteria                      D: Delirium: CAM-ICU Overall CAM-ICU: Positive   E: Early Mobility Performed? Yes   F: Feeding Goal: Goals: 1. Pt will be initiated onto EN feeding within 24-48hrs. 2. Pt will tolerate >60% of energy needs by RD follow up  Status: Nutrition Goal Status: new   Current Diet Order   Procedures    Diet NPO      AS: Analgesia/Sedation Propofol and fentanyl drips   T: Thromboembolic Prophylaxis Eliquis   H: HOB > 300 Yes   U: Stress Ulcer Prophylaxis (if needed) Pepcid   G: Glucose Control Subcu insulin   B: Bowel Function     I: Indwelling Catheter (Lines & Rojas) Necessity R IJ cvc  L radial A-line  Rojas   D: De-escalation of Antimicrobials/Pharmacotherapies Continue CFP      Plan for the day/ETD Continue vent and sedation  SAT/SBT  Abx  Steroids. RDV for COVID    Code Status:  Family/Goals of Care: Full Code       Critical Care Time: 60 minutes  Critical secondary to Patient has a condition that poses threat to life and bodily function: Acute resp failure 2/2 pneumonia and atelectasis requiring mechanical ventilation and continuous infusion of sedative medications.      Critical care was time spent personally by me on the following  activities: development of treatment plan with patient or surrogate and bedside caregivers, discussions with consultants, evaluation of patient's response to treatment, examination of patient, ordering and performing treatments and interventions, ordering and review of laboratory studies, ordering and review of radiographic studies, pulse oximetry, re-evaluation of patient's condition. This critical care time did not overlap with that of any other provider or involve time for any procedures.     Serjio Licona MD  Critical Care Medicine  [facility] - Intensive Care [facility])

## 2022-11-01 NOTE — SUBJECTIVE & OBJECTIVE
Review of Systems      HENT:  Negative for sore throat.    Respiratory:  shortness of breath    Cardiovascular:  Negative for chest pain.   Gastrointestinal:  Negative for nausea and vomiting.   Endocrine: -ve for polyuria   Genitourinary:  Negative for dysuria.   Musculoskeletal:  Negative for back pain.   Skin:  Negative for rash.   Neurological:  Negative for weakness.   Hematological:  Does not bruise/bleed easily.   All other systems reviewed and are negative.      Objective:     Vital Signs (Most Recent):  Temp: 98 °F (36.7 °C) (11/01/22 1115)  Pulse: (!) 119 (11/01/22 1200)  Resp: 16 (11/01/22 1200)  BP: (!) 169/92 (11/01/22 1200)  SpO2: 95 % (11/01/22 1200)   Vital Signs (24h Range):  Temp:  [97.1 °F (36.2 °C)-98.9 °F (37.2 °C)] 98 °F (36.7 °C)  Pulse:  [] 119  Resp:  [11-31] 16  SpO2:  [95 %-100 %] 95 %  BP: ()/() 169/92     Weight: 126.1 kg (278 lb)  Body mass index is 34.75 kg/m².    Intake/Output Summary (Last 24 hours) at 11/1/2022 1244  Last data filed at 11/1/2022 1200  Gross per 24 hour   Intake 3547.01 ml   Output 3270 ml   Net 277.01 ml      Physical Exam    Constitutional:       Appearance: He is obese. He is ill-appearing.   HENT:      Head: Normocephalic and atraumatic.      Nose: Nose normal.   Eyes:      Pupils: Pupils are equal, round, and reactive to light.   Cardiovascular:      Rate and Rhythm: Normal rate and regular rhythm.   Pulmonary:      Effort: Pulmonary effort is normal. No respiratory distress.      Comments: Symmetric chest rise.     Abdominal:      General: There is no distension.      Palpations: Abdomen is soft.   Musculoskeletal:         General: No deformity or signs of injury.      Right lower leg: No edema.      Left lower leg: No edema.   Skin:     General: Skin is warm and dry.   Neurological:      General: No focal deficit present.      Mental Status: He is alert and oriented to person, place, and time.     Constitutional: Patient is in severe  distress. Well-developed and well-nourished.  Head: Atraumatic. Normocephalic.  Eyes: PERRL. EOM intact. Conjunctivae are not pale. No scleral icterus.  ENT: dentures  Neck: Supple. Full ROM. No lymphadenopathy.  Cardiovascular: Regular rate. Regular rhythm. No murmurs, rubs, or gallops. Distal pulses are 2+ and symmetric.  Pulmonary/Chest: BVM in process  Abdominal: Soft and non-distended.  There is no tenderness.  No rebound, guarding, or rigidity.  Musculoskeletal: No edema.   Skin: Warm and dry.  Neurological:  intubated and sedated;     Significant Labs: All pertinent labs within the past 24 hours have been reviewed.  CBC:   Recent Labs   Lab 10/31/22  0450 11/01/22  0417   WBC 14.14* 11.66   HGB 12.7* 11.9*   HCT 39.4* 38.0*    237     CMP:   Recent Labs   Lab 10/31/22  0450 11/01/22  0417    143   K 4.0 3.7    107   CO2 29 27   * 107   BUN 20 14   CREATININE 0.7 0.6   CALCIUM 8.1* 8.1*   ANIONGAP 7* 9     Cardiac Markers: No results for input(s): CKMB, MYOGLOBIN, BNP, TROPISTAT in the last 48 hours.  Coagulation: No results for input(s): PT, INR, APTT in the last 48 hours.  Lactic Acid: No results for input(s): LACTATE in the last 48 hours.    Significant Imaging:     Imaging Results              CTA Chest Non-Coronary (PE Studies) (Final result)  Result time 10/30/22 13:44:24      Final result by Raheem Hendrickson MD (10/30/22 13:44:24)                   Impression:      1. No pulmonary embolism.  2. Mucous plug in the right mainstem bronchus with total atelectasis of the right middle lobe and right lower lobe.  3. Marked dependent atelectasis in the basilar segments left lower lobe and dependent atelectasis in the right upper lobe.  4. Extensive calcific atherosclerotic disease of the coronary arteries.  All CT scans at this facility are performed  using dose modulation techniques as appropriate to performed exam including the following:  automated exposure control; adjustment of mA  and/or kV according to the patients size (this includes techniques or standardized protocols for targeted exams where dose is matched to indication/reason for exam: i.e. extremities or head);  iterative reconstruction technique.      Electronically signed by: Raheem Hendrickson MD  Date:    10/30/2022  Time:    13:44               Narrative:    EXAMINATION:  CTA CHEST NON CORONARY (PE STUDIES)    CLINICAL HISTORY:  Pulmonary embolism (PE) suspected, high prob;    TECHNIQUE:  Axial CTA images performed through the chest after the administration of 100 cc intravenous contrast. 3D MIP images were performed and interpreted.    COMPARISON:  None    FINDINGS:  No filling defects in the pulmonary arteries to indicate a pulmonary embolism.  Calcific atherosclerotic disease of the coronary arteries.  No pericardial effusion.  NG tube tip in the fundus of the stomach.  Right IJ central venous catheter tip in the SVC.    There is a mucous plug in the right mainstem bronchus with total atelectasis of the right middle lobe and right lower lobe.  There is dependent atelectasis in the right upper lobe.  There is marked atelectasis in the basilar segments of the left lower lobe.  No pleural effusion or pneumothorax.    No acute osseous abnormality.                                       X-Ray Chest 1 View (Final result)  Result time 10/30/22 11:06:21      Final result by Raheem Hendrickson MD (10/30/22 11:06:21)                   Impression:      See above      Electronically signed by: Raheem Hendrickson MD  Date:    10/30/2022  Time:    11:06               Narrative:    EXAMINATION:  XR CHEST 1 VIEW    CLINICAL HISTORY:  Central line plcmnt;    COMPARISON:  Chest x-ray performed less than 2 hours ago    FINDINGS:  Endotracheal tube in good position.  NG tube tip in the fundus of the stomach.  Newly placed right IJ central venous catheter tip in the SVC.  No pneumothorax.  Right middle lobe and right lower lobe total atelectasis.  Marked left  basilar atelectasis.                                       CT Head Without Contrast (Final result)  Result time 10/30/22 09:52:01      Final result by Raheem Hendrickson MD (10/30/22 09:52:01)                   Impression:      1. No acute intracranial process.  2. 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  3. Fluid filling the right middle ear cavity which can be seen with otitis media.  4. Paranasal sinus disease.  All CT scans at this facility are performed  using dose modulation techniques as appropriate to performed exam including the following:  automated exposure control; adjustment of mA and/or kV according to the patients size (this includes techniques or standardized protocols for targeted exams where dose is matched to indication/reason for exam: i.e. extremities or head);  iterative reconstruction technique.      Electronically signed by: Raheem Hendrickson MD  Date:    10/30/2022  Time:    09:52               Narrative:    EXAMINATION:  CT HEAD WITHOUT CONTRAST    CLINICAL HISTORY:  Mental status change, unknown cause;    TECHNIQUE:  Axial CT imaging was performed through the head without intravenous contrast.    COMPARISON:  None    FINDINGS:  No hydrocephalus, midline shift, mass effect, or acute intracranial hemorrhage. Chronic small vessel ischemic change of the white matter.  Old lacunar infarcts in the left caudate nucleus head and body.  Mucosal thickening of the paranasal sinuses.  Fluid filling the right middle ear cavity..  The skull is intact.                                       X-Ray Chest AP Portable (Final result)  Result time 10/30/22 09:26:02      Final result by Raheem Hendrickson MD (10/30/22 09:26:02)                   Impression:      See above      Electronically signed by: Raheem Hendrickson MD  Date:    10/30/2022  Time:    09:26               Narrative:    EXAMINATION:  XR CHEST AP PORTABLE    CLINICAL HISTORY:  Presence of other  specified devices    COMPARISON:  None.    FINDINGS:  Endotracheal tube in good position.  The NG tube courses down the esophagus with its tip in the fundus of the stomach.  There are markedly elevated hemidiaphragms with marked low lung volumes.  Right middle lobe and right lower lobe total atelectasis with a cut off sign of the right mainstem bronchus.  Marked left basilar atelectasis.  No pneumothorax.

## 2022-11-02 LAB
ANION GAP SERPL CALC-SCNC: 9 MMOL/L (ref 8–16)
BASOPHILS # BLD AUTO: 0.01 K/UL (ref 0–0.2)
BASOPHILS NFR BLD: 0.1 % (ref 0–1.9)
BUN SERPL-MCNC: 12 MG/DL (ref 8–23)
CALCIUM SERPL-MCNC: 8.4 MG/DL (ref 8.7–10.5)
CHLORIDE SERPL-SCNC: 104 MMOL/L (ref 95–110)
CO2 SERPL-SCNC: 28 MMOL/L (ref 23–29)
CREAT SERPL-MCNC: 0.6 MG/DL (ref 0.5–1.4)
DIFFERENTIAL METHOD: ABNORMAL
ENTEROVIRUS/RHINOVIRUS: NOT DETECTED
EOSINOPHIL # BLD AUTO: 0 K/UL (ref 0–0.5)
EOSINOPHIL NFR BLD: 0.1 % (ref 0–8)
ERYTHROCYTE [DISTWIDTH] IN BLOOD BY AUTOMATED COUNT: 16.7 % (ref 11.5–14.5)
EST. GFR  (NO RACE VARIABLE): >60 ML/MIN/1.73 M^2
GLUCOSE SERPL-MCNC: 76 MG/DL (ref 70–110)
HCT VFR BLD AUTO: 40.9 % (ref 40–54)
HGB BLD-MCNC: 12.7 G/DL (ref 14–18)
HUMAN BOCAVIRUS: NOT DETECTED
HUMAN CORONAVIRUS, COMMON COLD VIRUS: NOT DETECTED
IMM GRANULOCYTES # BLD AUTO: 0.07 K/UL (ref 0–0.04)
IMM GRANULOCYTES NFR BLD AUTO: 0.7 % (ref 0–0.5)
INFLUENZA A - H1N1-09: NOT DETECTED
LEGIONELLA PNEUMOPHILA: NOT DETECTED
LYMPHOCYTES # BLD AUTO: 2.1 K/UL (ref 1–4.8)
LYMPHOCYTES NFR BLD: 20 % (ref 18–48)
MAGNESIUM SERPL-MCNC: 2 MG/DL (ref 1.6–2.6)
MCH RBC QN AUTO: 30 PG (ref 27–31)
MCHC RBC AUTO-ENTMCNC: 31.1 G/DL (ref 32–36)
MCV RBC AUTO: 97 FL (ref 82–98)
MONOCYTES # BLD AUTO: 0.9 K/UL (ref 0.3–1)
MONOCYTES NFR BLD: 8 % (ref 4–15)
MORAXELLA CATARRHALIS: NOT DETECTED
NEUTROPHILS # BLD AUTO: 7.5 K/UL (ref 1.8–7.7)
NEUTROPHILS NFR BLD: 71.1 % (ref 38–73)
NRBC BLD-RTO: 0 /100 WBC
PARAINFLUENZA: NOT DETECTED
PLATELET # BLD AUTO: 245 K/UL (ref 150–450)
PMV BLD AUTO: 9.6 FL (ref 9.2–12.9)
POCT GLUCOSE: 101 MG/DL (ref 70–110)
POCT GLUCOSE: 105 MG/DL (ref 70–110)
POCT GLUCOSE: 72 MG/DL (ref 70–110)
POCT GLUCOSE: 83 MG/DL (ref 70–110)
POTASSIUM SERPL-SCNC: 3.8 MMOL/L (ref 3.5–5.1)
RBC # BLD AUTO: 4.24 M/UL (ref 4.6–6.2)
RVP - ADENOVIRUS: NOT DETECTED
RVP - HUMAN METAPNEUMOVIRUS (HMPV): NOT DETECTED
RVP - INFLUENZA A: NOT DETECTED
RVP - INFLUENZA B: NOT DETECTED
RVP - RESPIRATORY SYNCTIAL VIRUS (RSV) A: NOT DETECTED
RVP - RESPIRATORY VIRAL PANEL, SOURCE: ABNORMAL
SODIUM SERPL-SCNC: 141 MMOL/L (ref 136–145)
TEM - ACINETOBACTER BAUMANNII: NOT DETECTED
TEM - BORDETELLA PERTUSSIS: NOT DETECTED
TEM - CHLAMYDOPHILA PNEUMONIAE: NOT DETECTED
TEM - KLEBSIELLA PNEUMONIAE: NOT DETECTED
TEM - MRSA: POSITIVE
TEM - MYCOPLASMA PNEUMONIAE: NOT DETECTED
TEM - NEISSERIA MENINGITIDIS: NOT DETECTED
TEM - PANTON-VALENTINE: NOT DETECTED
TEM - PSEUDOMONAS AERUGINOSA: NOT DETECTED
TEM - STAPHYLOCOCCUS AUREUS: NOT DETECTED
TEM - STREPTOCOCCUS PNEUMONIAE: NOT DETECTED
TEM - STREPTOCOCCUS PYOGENES A: NOT DETECTED
TEM- HAEMOPHILUS INFLUENZAE B: NOT DETECTED
TEM- HAEMOPHILUS INFLUENZAE: NOT DETECTED
TRIGL SERPL-MCNC: 63 MG/DL (ref 30–150)
WBC # BLD AUTO: 10.56 K/UL (ref 3.9–12.7)

## 2022-11-02 PROCEDURE — 63600175 PHARM REV CODE 636 W HCPCS: Performed by: INTERNAL MEDICINE

## 2022-11-02 PROCEDURE — 97162 PT EVAL MOD COMPLEX 30 MIN: CPT

## 2022-11-02 PROCEDURE — 20000000 HC ICU ROOM

## 2022-11-02 PROCEDURE — 94664 DEMO&/EVAL PT USE INHALER: CPT

## 2022-11-02 PROCEDURE — 97166 OT EVAL MOD COMPLEX 45 MIN: CPT

## 2022-11-02 PROCEDURE — 80048 BASIC METABOLIC PNL TOTAL CA: CPT | Performed by: INTERNAL MEDICINE

## 2022-11-02 PROCEDURE — 27000221 HC OXYGEN, UP TO 24 HOURS

## 2022-11-02 PROCEDURE — 25000003 PHARM REV CODE 250: Performed by: INTERNAL MEDICINE

## 2022-11-02 PROCEDURE — 97530 THERAPEUTIC ACTIVITIES: CPT

## 2022-11-02 PROCEDURE — 94640 AIRWAY INHALATION TREATMENT: CPT

## 2022-11-02 PROCEDURE — 25000242 PHARM REV CODE 250 ALT 637 W/ HCPCS: Performed by: INTERNAL MEDICINE

## 2022-11-02 PROCEDURE — 94799 UNLISTED PULMONARY SVC/PX: CPT

## 2022-11-02 PROCEDURE — 99900035 HC TECH TIME PER 15 MIN (STAT)

## 2022-11-02 PROCEDURE — 99233 PR SUBSEQUENT HOSPITAL CARE,LEVL III: ICD-10-PCS | Mod: ,,, | Performed by: INTERNAL MEDICINE

## 2022-11-02 PROCEDURE — 63600175 PHARM REV CODE 636 W HCPCS: Performed by: STUDENT IN AN ORGANIZED HEALTH CARE EDUCATION/TRAINING PROGRAM

## 2022-11-02 PROCEDURE — 99233 SBSQ HOSP IP/OBS HIGH 50: CPT | Mod: ,,, | Performed by: INTERNAL MEDICINE

## 2022-11-02 PROCEDURE — 85025 COMPLETE CBC W/AUTO DIFF WBC: CPT | Performed by: INTERNAL MEDICINE

## 2022-11-02 PROCEDURE — 27000207 HC ISOLATION

## 2022-11-02 PROCEDURE — 27000646 HC AEROBIKA DEVICE

## 2022-11-02 PROCEDURE — 84478 ASSAY OF TRIGLYCERIDES: CPT | Performed by: STUDENT IN AN ORGANIZED HEALTH CARE EDUCATION/TRAINING PROGRAM

## 2022-11-02 PROCEDURE — 94761 N-INVAS EAR/PLS OXIMETRY MLT: CPT

## 2022-11-02 PROCEDURE — 63600175 PHARM REV CODE 636 W HCPCS: Mod: TB | Performed by: INTERNAL MEDICINE

## 2022-11-02 PROCEDURE — 83735 ASSAY OF MAGNESIUM: CPT | Performed by: INTERNAL MEDICINE

## 2022-11-02 RX ORDER — AMLODIPINE BESYLATE 5 MG/1
5 TABLET ORAL DAILY
Status: DISCONTINUED | OUTPATIENT
Start: 2022-11-02 | End: 2022-11-03 | Stop reason: HOSPADM

## 2022-11-02 RX ORDER — FUROSEMIDE 10 MG/ML
20 INJECTION INTRAMUSCULAR; INTRAVENOUS ONCE
Status: COMPLETED | OUTPATIENT
Start: 2022-11-02 | End: 2022-11-02

## 2022-11-02 RX ORDER — POLYETHYLENE GLYCOL 3350 17 G/17G
17 POWDER, FOR SOLUTION ORAL DAILY
Status: DISCONTINUED | OUTPATIENT
Start: 2022-11-02 | End: 2022-11-03 | Stop reason: HOSPADM

## 2022-11-02 RX ORDER — LACTULOSE 10 G/15ML
30 SOLUTION ORAL ONCE
Status: COMPLETED | OUTPATIENT
Start: 2022-11-02 | End: 2022-11-02

## 2022-11-02 RX ORDER — METOPROLOL SUCCINATE 50 MG/1
50 TABLET, EXTENDED RELEASE ORAL DAILY
Status: DISCONTINUED | OUTPATIENT
Start: 2022-11-02 | End: 2022-11-03 | Stop reason: HOSPADM

## 2022-11-02 RX ORDER — ZINC SULFATE 50(220)MG
220 CAPSULE ORAL DAILY
Status: DISCONTINUED | OUTPATIENT
Start: 2022-11-02 | End: 2022-11-03 | Stop reason: HOSPADM

## 2022-11-02 RX ORDER — DOCUSATE SODIUM 100 MG/1
100 CAPSULE, LIQUID FILLED ORAL DAILY
Status: DISCONTINUED | OUTPATIENT
Start: 2022-11-02 | End: 2022-11-02

## 2022-11-02 RX ORDER — FAMOTIDINE 20 MG/1
20 TABLET, FILM COATED ORAL 2 TIMES DAILY
Status: DISCONTINUED | OUTPATIENT
Start: 2022-11-02 | End: 2022-11-03 | Stop reason: HOSPADM

## 2022-11-02 RX ORDER — AMOXICILLIN 250 MG
2 CAPSULE ORAL DAILY
Status: DISCONTINUED | OUTPATIENT
Start: 2022-11-02 | End: 2022-11-03 | Stop reason: HOSPADM

## 2022-11-02 RX ORDER — ACETAMINOPHEN 325 MG/1
650 TABLET ORAL EVERY 4 HOURS PRN
Status: DISCONTINUED | OUTPATIENT
Start: 2022-11-02 | End: 2022-11-03 | Stop reason: HOSPADM

## 2022-11-02 RX ORDER — ASCORBIC ACID 500 MG
1000 TABLET ORAL DAILY
Status: DISCONTINUED | OUTPATIENT
Start: 2022-11-02 | End: 2022-11-03 | Stop reason: HOSPADM

## 2022-11-02 RX ADMIN — SODIUM CHLORIDE: 9 INJECTION, SOLUTION INTRAVENOUS at 02:11

## 2022-11-02 RX ADMIN — SENNOSIDES AND DOCUSATE SODIUM 2 TABLET: 50; 8.6 TABLET ORAL at 10:11

## 2022-11-02 RX ADMIN — REMDESIVIR 100 MG: 100 INJECTION, POWDER, LYOPHILIZED, FOR SOLUTION INTRAVENOUS at 08:11

## 2022-11-02 RX ADMIN — DEXAMETHASONE 6 MG: 4 TABLET ORAL at 08:11

## 2022-11-02 RX ADMIN — AMLODIPINE BESYLATE 5 MG: 5 TABLET ORAL at 08:11

## 2022-11-02 RX ADMIN — METOPROLOL SUCCINATE 50 MG: 50 TABLET, EXTENDED RELEASE ORAL at 08:11

## 2022-11-02 RX ADMIN — DOCUSATE SODIUM 100 MG: 100 CAPSULE, LIQUID FILLED ORAL at 08:11

## 2022-11-02 RX ADMIN — SODIUM CHLORIDE 30 MG/ML INHALATION SOLUTION 4 ML: 30 SOLUTION INHALANT at 07:11

## 2022-11-02 RX ADMIN — LACTULOSE 30 G: 20 SOLUTION ORAL at 10:11

## 2022-11-02 RX ADMIN — POLYETHYLENE GLYCOL 3350 17 G: 17 POWDER, FOR SOLUTION ORAL at 08:11

## 2022-11-02 RX ADMIN — APIXABAN 5 MG: 2.5 TABLET, FILM COATED ORAL at 08:11

## 2022-11-02 RX ADMIN — FAMOTIDINE 20 MG: 20 TABLET ORAL at 08:11

## 2022-11-02 RX ADMIN — MUPIROCIN: 20 OINTMENT TOPICAL at 08:11

## 2022-11-02 RX ADMIN — CEFEPIME 2 G: 2 INJECTION, POWDER, FOR SOLUTION INTRAVENOUS at 11:11

## 2022-11-02 RX ADMIN — ZINC SULFATE 220 MG (50 MG) CAPSULE 220 MG: CAPSULE at 08:11

## 2022-11-02 RX ADMIN — CEFEPIME 2 G: 2 INJECTION, POWDER, FOR SOLUTION INTRAVENOUS at 07:11

## 2022-11-02 RX ADMIN — FUROSEMIDE 20 MG: 10 INJECTION, SOLUTION INTRAMUSCULAR; INTRAVENOUS at 08:11

## 2022-11-02 RX ADMIN — OXYCODONE HYDROCHLORIDE AND ACETAMINOPHEN 1000 MG: 500 TABLET ORAL at 08:11

## 2022-11-02 RX ADMIN — CEFEPIME 2 G: 2 INJECTION, POWDER, FOR SOLUTION INTRAVENOUS at 03:11

## 2022-11-02 NOTE — PLAN OF CARE
EVAL AND TX COMPLETED: facilitated bed mobility with max A, unable to perform transfers or gait activity. Limited secondary to fatigue, weakness and SOB with exertion. Recommend SNF

## 2022-11-02 NOTE — PROGRESS NOTES
[facility] - [facility] ([facility])  Adult Nutrition  Progress Note    SUMMARY       Recommendations    Recommendation/Intervention:   1. Recommend continue Cardiac diet  2. Recommend Boost plus BID to fill any nutritional gaps  3. Recommend continue bowel regimen    Goals:   1. Pt will continue to tolerate and consume >75% EEN by RD follow up  2. Pt will have BM by RD follow up  Nutrition Goal Status: Continues  Communication of RD Recs: other (comment) (POC: Sticky note)    Assessment and Plan  Nutrition Problem  Inadequate oral intake     Related to (etiology):   Decreased ability to consume sufficient protein/energy      Signs and Symptoms (as evidenced by):   NPO, intubated (Extubated this morning)     Interventions(treatment strategy):  1. Recommend continue Cardiac diet  2. Recommend Boost plus BID to fill any nutritional gaps  3. Recommend continue bowel regimen  4. Collaboration of care with medical providers     Nutrition Diagnosis Status:   Continues      Malnutrition Assessment       Reason for Assessment    Reason For Assessment: consult  Diagnosis: pulmonary disease  Relevant Medical History: HTN, HLD, Vent, BMI: 35  General Information Comments: 72 y.o. male consulted d/t resp failure. Pt has PMH of HTN and HLD. Pt has current BMI of 35. Pt currently is intubate and sedated. Currently on propofol 22.9 providing 604.56kcal, Total Ve 14.5. Current abnormal nutrition labs: Glu 140, Ca 8.1. Per EMR, pt urine out is improved. Pt I/O: +2732 since admit. Will continue to monitor.  Nutrition Discharge Planning: Pending on medical treatment: Cardiac diet    Follow-up:  11/2: Visited pt at bedside, MD was working with pt at time of visit. Spoke to RN, stated pt was extubated this morning, pt was placed on cardiac diet, pt ate some of breakfast but had to pause to work with MD, pt planned to resume eating after MD visit, no % intake charted yet. RN reported pt was not experiencing any N/V/D, RN agreed pt would be  "receptive to Boost plus to fill nutritional gaps. Reviewed chart: LBM 10/29 (4 x days no BM), Skin integrity-bruised, Cj score: 14, no edema, bowel sounds hypoactive. Labs, meds, weight reviewed. Calcium (L), noted Docusate BID, no previous wt charted. RD will continue to monitor.      Nutrition Risk Screen    Nutrition Risk Screen: tube feeding or parenteral nutrition    Nutrition/Diet History    Spiritual, Cultural Beliefs, Yazdanism Practices, Values that Affect Care: no  Food Allergies: NKFA  Factors Affecting Nutritional Intake: on mechanical ventilation, NPO    Anthropometrics    Temp: 98.1 °F (36.7 °C)  Height Method: Estimated  Height: 6' 3" (190.5 cm)  Height (inches): 75 in  Weight Method: Bed Scale  Weight: 126.1 kg (278 lb)  Weight (lb): 278 lb  Ideal Body Weight (IBW), Male: 196 lb  % Ideal Body Weight, Male (lb): 142.85 %  BMI (Calculated): 34.7  BMI Grade: 35 - 39.9 - obesity - grade II     Wt Readings from Last 100 Encounters:   11/01/22 126.1 kg (278 lb)     Lab/Procedures/Meds    Pertinent Labs Reviewed: reviewed  Pertinent Labs Comments: Glu 140, Ca 8.1  Pertinent Medications Reviewed: reviewed  BMP  Lab Results   Component Value Date     11/02/2022    K 3.8 11/02/2022     11/02/2022    CO2 28 11/02/2022    BUN 12 11/02/2022    CREATININE 0.6 11/02/2022    CALCIUM 8.4 (L) 11/02/2022    ANIONGAP 9 11/02/2022     Recent Labs   Lab 11/02/22  0013   POCTGLUCOSE 83     Scheduled Meds:   amLODIPine  5 mg Oral Daily    apixaban  5 mg Oral BID    ascorbic acid (vitamin C)  1,000 mg Oral Daily    ceFEPime (MAXIPIME) IVPB  2 g Intravenous Q8H    dexAMETHasone  6 mg Oral Daily    famotidine  20 mg Oral BID    lactulose  30 g Oral Once    metoprolol succinate  50 mg Oral Daily    mupirocin   Nasal BID    polyethylene glycol  17 g Oral Daily    remdesivir infusion  100 mg Intravenous Daily    senna-docusate 8.6-50 mg  2 tablet Oral Daily    sodium chloride 3%  4 mL Nebulization Q8H    zinc " sulfate  220 mg Oral Daily     Continuous Infusions:  PRN Meds:.acetaminophen, albuterol **AND** MDI Q4H PRN, calcium gluconate IVPB, calcium gluconate IVPB, calcium gluconate IVPB, dextrose 10%, dextrose 10%, glucagon (human recombinant), hydrALAZINE, influenza 65up-adj, insulin aspart U-100, labetaloL, magnesium sulfate IVPB, magnesium sulfate IVPB, morphine, ondansetron, pneumoc 20-slime conj-dip cr(PF), potassium chloride in water **AND** potassium chloride in water, sodium chloride 0.9%, sodium chloride 0.9%, sodium phosphate IVPB, sodium phosphate IVPB, sodium phosphate IVPB    Physical Findings/Assessment         Estimated/Assessed Needs    Weight Used For Calorie Calculations: 127 kg (279 lb 15.8 oz)  Energy Calorie Requirements (kcal): 2534  Energy Need Method: [facility] (modified)  Protein Requirements: 178 (IBW used d/t BMI > 30, on Vent, 2g/kg)  Weight Used For Protein Calculations: 89.1 kg (196 lb 6.5 oz)  Fluid Requirements (mL): 2534  Estimated Fluid Requirement Method: RDA Method  RDA Method (mL): 2534  CHO Requirement: 316      Nutrition Prescription Ordered    Current Diet Order: NPO    Evaluation of Received Nutrient/Fluid Intake  I/O: (Net since admit)  10/31: +2732 mL  11/2: +879.7 mL  % Kcal Needs: 0  % Protein Needs: 0  Total Fluid Intake (mL): 3452  Energy Calories Required: not meeting needs  Protein Required: not meeting needs  Fluid Required: meeting needs  % Intake of Estimated Energy Needs: 0 - 25 %  % Meal Intake: 0 - 25 %    Nutrition Risk    Level of Risk/Frequency of Follow-up: high (F/u 2 x weekly)    Monitor and Evaluation    Food and Nutrient Intake: energy intake, enteral nutrition intake, food and beverage intake  Food and Nutrient Adminstration: diet order, enteral and parenteral nutrition administration  Knowledge/Beliefs/Attitudes: food and nutrition knowledge/skill, beliefs and attitudes  Anthropometric Measurements: weight, weight change, body mass index  Biochemical Data,  Medical Tests and Procedures: electrolyte and renal panel, gastrointestinal profile, glucose/endocrine profile, inflammatory profile, lipid profile  Nutrition-Focused Physical Findings: overall appearance     Nutrition Follow-Up    RD Follow-up?: Yes  Gail Degrootitian

## 2022-11-02 NOTE — PLAN OF CARE
Pt AAOx4, MAEW. Sinus tachycardia on telemetry. Hypertension today. Labetalol PRN. Pt extubated to Bipap this morning. Now oxygenating well on 4L NC. Bedside swallow screen tonight. Voiding per muñiz catheter. Diuresed with lasix with good response: -2.3L this shift.  Pt turned with wedge support. Heel boots in place. Bed low, wheels locked, alarms audible. POC reviewed with pt and wife, Aubree, today.

## 2022-11-02 NOTE — PT/OT/SLP EVAL
Occupational Therapy   Evaluation    Name: Brady Haines  MRN: [id number]  Admitting Diagnosis:  COVID  Recent Surgery: * No surgery found *      Recommendations:     Discharge Recommendations: nursing facility, skilled  Discharge Equipment Recommendations:  other (see comments) (TBD by next level of care)  Barriers to discharge:  None    Assessment:     Brady Haines is a 72 y.o. male with a medical diagnosis of COVID.  He presents with the following performance deficits affecting function: weakness, impaired endurance, impaired self care skills, impaired functional mobility, gait instability, impaired balance, impaired cognition, decreased upper extremity function, decreased lower extremity function, decreased safety awareness, impaired cardiopulmonary response to activity.      Rehab Prognosis: Fair; patient would benefit from acute skilled OT services to address these deficits and reach maximum level of function.       Plan:     Patient to be seen 2 x/week to address the above listed problems via therapeutic activities, therapeutic exercises, self-care/home management  Plan of Care Expires: 11/16/22  Plan of Care Reviewed with: patient    Subjective     Chief Complaint: weakness, fatigue, SOB  Patient/Family Comments/goals: improve strength    Occupational Profile:  Living Environment: lives with wife in a 1 story house with 4 steps to enter and B railings. Pt was in a SNF PTA.  Previous level of function: Pt (I) with ADLs and functional mobility.   Roles and Routines: drives and is retired.  Equipment Used at Home:  wheelchair, rollator (built-in bath bench)  Assistance upon Discharge: wife    Pain/Comfort:  Pain Rating 1: 0/10    Objective:     Communicated with: nurse and epic chart review prior to session.  Patient found supine with blood pressure cuff, central line, muñiz catheter, oxygen, pressure relief boots, SCD, telemetry, peripheral IV, pulse ox (continuous) upon OT entry to room.    General Precautions:  Standard, fall, airborne, contact, droplet   Orthopedic Precautions:N/A   Braces: N/A  Respiratory Status: Nasal cannula, flow 4 L/min    Occupational Performance:    Bed Mobility:    Patient completed Rolling/Turning to Right with maximal assistance  Patient completed Scooting/Bridging with maximal assistance  Patient completed Supine to Sit with maximal assistance  Patient completed Sit to Supine with maximal assistance    Cognitive/Visual Perceptual:  Cognitive/Psychosocial Skills:     -       Oriented to: Person, Place, and Time   -       Follows Commands/attention:Follows two-step commands  -       Safety awareness/insight to disability: impaired     Physical Exam:  Sensation:    -       Intact  Upper Extremity Range of Motion:     -       Right Upper Extremity: Limited AROM in shoulder, elbow WNL  -       Left Upper Extremity: Limited AROM in shoulder, elbow WNL  Upper Extremity Strength:    -       Right Upper Extremity: 3-/5 in shoulder d/t weakness, 4-/5 in elbow  -       Left Upper Extremity: 3-/5 in shoulder d/t weakness, 4-/5 in elbow   Strength:    -       Right Upper Extremity: fair  -       Left Upper Extremity: fair    AMPAC 6 Click ADL:  AMPAC Total Score: 8    Treatment & Education:  Pt requiring extended time to complete tasks. Pt sat EOB ~5 minutes with CGA for sitting balance. Pt fatiguing quickly and demonstrating SOB. Educated pt on pursed lip breathing technique and importance of activity pacing. Returned pt to bed. Patient educated on role of OT in acute setting and benefits of participation. Educated on techniques to use to increase independence and improve bed mobility. Educated on importance of EOB/OOB activity and calling for A to meet needs. Encouraged completion of B UE AROM therex throughout the day to tolerance to increase functional strength and activity tolerance. Patient stated understanding and in agreement with POC.     Patient left HOB elevated with all lines intact, call  button in reach, and nurse notified    GOALS:   Multidisciplinary Problems       Occupational Therapy Goals          Problem: Occupational Therapy    Goal Priority Disciplines Outcome Interventions   Occupational Therapy Goal     OT, PT/OT     Description: Goals to be met by: 11/16/22     Patient will increase functional independence with ADLs by performing:    UE Dressing with Stand-by Assistance.  Stand pivot transfers with Contact Guard Assistance.  Upper extremity exercise program x20 reps per handout, with independence.                         History:     Past Medical History:   Diagnosis Date    Essential (primary) hypertension     Mixed hyperlipidemia        History reviewed. No pertinent surgical history.    Time Tracking:     OT Date of Treatment: 11/02/22  OT Start Time: 1000  OT Stop Time: 1025  OT Total Time (min): 25 min    Billable Minutes:Evaluation 15  Therapeutic Activity 10    11/2/2022  Raya Link OT

## 2022-11-02 NOTE — PROGRESS NOTES
[facility] ([facility] Medicine  Progress Note    Patient Name: Brady Haines  MRN: [id number]  Patient Class: IP- Inpatient   Admission Date: 10/30/2022  Length of Stay: 3 days  Attending Physician: Guerline Arevalo, *  Primary Care Provider: Mickie Hui MD        Subjective:     Principal Problem:COVID        HPI:  Patient was intubated and sedated at the time of examination, got most of information from documentation    Brady Haines is a 72 y.o. male patient with a PMHx of Hypertension and Sepsis due to methicillin susceptible Staphylococcus aureus (MSSA) with acute hypercapnic respiratory failure without septic shock (HCC) (5/2/2022) who presents to the Emergency Department for evaluation of unresponsiveness. Pt was found unresponsive at a nursing home this morning. Per EMS, pt was found with agonal breathing and O2 sat of 40%. Pt was found with blood glucose of 100. EMS tried to intubate en route but was unsuccessful. EMS was also unsuccessful at starting an IV. Per EMS, pt responds to painful stimuli. Symptoms are constant and moderate in severity. HPI and ROS limited due to pt being unresponsive.    Patient is a correction resident of [facility] and requires 24/7 care and support for all activities. As per wife- patient has a history of prolonged  hospitalization at [facility] in May 2022: OL due Spine Abscess: (Staph and acinetobacter)  Unable to participate in physical therapy due to knee joint issues ;           Overview/Hospital Course:  10/31     Examination done at bedside, patient currently intubated, sedated, pressor support as needed to maintain map above 65;  Possible bronch given finding of atelectasis.    Remedesvir, dexamethasone for COVID    11/1:  Examination done at bedside, intubated, sedated, plan for SBT trial today   Status post bronch yesterday.  Labs reviewed   Tachycardic, hypertensive  COVID management    11/2     Examination done at  bedside.  Patient got extubated currently on 3 L nasal cannula   Off of drips , on p.o. blood pressure medications   Passed bedside swallow evaluation   Labs reviewed, creatinine stable   Medications for COVID              I    Review of Systems    Intubated - unable to obtain ROS  Objective:     Vital Signs (Most Recent):  Temp: 98.1 °F (36.7 °C) (11/02/22 0701)  Pulse: 104 (11/02/22 1000)  Resp: (!) 31 (11/02/22 1000)  BP: (!) 152/94 (11/02/22 1000)  SpO2: 95 % (11/02/22 1000)   Vital Signs (24h Range):  Temp:  [97.3 °F (36.3 °C)-98.1 °F (36.7 °C)] 98.1 °F (36.7 °C)  Pulse:  [102-123] 104  Resp:  [13-31] 31  SpO2:  [92 %-100 %] 95 %  BP: (132-170)/() 152/94     Weight: 126.1 kg (278 lb)  Body mass index is 34.75 kg/m².    Intake/Output Summary (Last 24 hours) at 11/2/2022 1239  Last data filed at 11/2/2022 0800  Gross per 24 hour   Intake 1878.72 ml   Output 4150 ml   Net -2271.28 ml      Physical Exam    Constitutional:       Appearance: He is obese. He is ill-appearing.   HENT:      Head: Normocephalic and atraumatic.      Nose: Nose normal.   Eyes:      Pupils: Pupils are equal, round, and reactive to light.   Cardiovascular:      Rate and Rhythm: Normal rate and regular rhythm.   Pulmonary:      Effort: Pulmonary effort is normal. No respiratory distress.      Comments: Symmetric chest rise.     Abdominal:      General: There is no distension.      Palpations: Abdomen is soft.   Musculoskeletal:         General: No deformity or signs of injury.      Right lower leg: No edema.      Left lower leg: No edema.   Skin:     General: Skin is warm and dry.   Neurological:      General: No focal deficit present.      Mental Status: He is alert and oriented to person, place, and time.      Constitutional: Patient is in severe distress. Well-developed and well-nourished.  Head: Atraumatic. Normocephalic.  Eyes: PERRL. EOM intact. Conjunctivae are not pale. No scleral icterus.  ENT: dentures  Neck: Supple. Full ROM.  No lymphadenopathy.  Cardiovascular: Regular rate. Regular rhythm. No murmurs, rubs, or gallops. Distal pulses are 2+ and symmetric.  Pulmonary/Chest: BVM in process  Abdominal: Soft and non-distended.  There is no tenderness.  No rebound, guarding, or rigidity.  Musculoskeletal: No edema.   Skin: Warm and dry.  Neurological:  intubated and sedated;     Significant Labs:     Recent Labs   Lab 10/31/22  0450 11/01/22  0417   WBC 14.14* 11.66   HGB 12.7* 11.9*   HCT 39.4* 38.0*    237      CMP:        Recent Labs   Lab 10/31/22  0450 11/01/22  0417    143   K 4.0 3.7    107   CO2 29 27   * 107   BUN 20 14   CREATININE 0.7 0.6   CALCIUM 8.1* 8.1*   ANIONGAP 7* 9       Significant Imaging:       Assessment/Plan:      Right middle lobe pneumonia  CTA chest: No pulmonary embolism. Mucous plug in the right mainstem bronchus with total atelectasis of the right middle lobe and right lower lobe.   Marked dependent atelectasis in the basilar segments left lower lobe and dependent atelectasis in the right upper lobe. Extensive calcific atherosclerotic disease of the coronary arteries.  All CT scans at this facility are performed  using dose modulation techniques as appropriate to performed exam including the following:  automated exposure control; adjustment of mA and/or kV according to the patients size (this includes techniques or standardized protocols for targeted exams where dose is matched to indication/reason for exam: i.e. extremities or head);  iterative reconstruction technique.Sputum culture  ? Aspiration pneumonia with mucus plug;   Possible plan for bronchoscopy, antibiotics, follow-up on cultures      COVID-19 virus antibody detected  COVID positive, intubated  Remedesvir, dexamethasone      On mechanically assisted ventilation  Intubated  Spontaneous breathing trials   ABG      Acute deep vein thrombosis (DVT) of tibial vein of both lower extremities  On Eliquis  CTA chest negative for  PE      Hypertension  Home meds  Trops -ve  echo      Atelectasis  Bronchodilators  Possible bronch for mucus plugs       Acute respiratory failure with hypoxia    Intubated   Bronch for underlying atelectasis    11/1:  S/p bronch as of 10/31      VTE Risk Mitigation (From admission, onward)         Ordered     apixaban tablet 5 mg  2 times daily         11/02/22 0747     IP VTE HIGH RISK PATIENT  Once         10/30/22 1513     Place MELANEI hose  Until discontinued         10/30/22 1214     Place sequential compression device  Until discontinued         10/30/22 1214     Place sequential compression device  Until discontinued         10/30/22 1211                Discharge Planning   ABDIAS:      Code Status: Full Code   Is the patient medically ready for discharge?:     Reason for patient still in hospital (select all that apply): icu care   Discharge Plan A: Return to nursing home            Critical care time spent on the evaluation and treatment of severe organ dysfunction, review of pertinent labs and imaging studies, discussions with consulting providers and discussions with patient/family: 58 minutes.      Guerline Arevalo MD  Department of Hospital Medicine   [facility] - Intensive [facility] ([facility])

## 2022-11-02 NOTE — SUBJECTIVE & OBJECTIVE
I    Review of Systems    Intubated - unable to obtain ROS  Objective:     Vital Signs (Most Recent):  Temp: 98.1 °F (36.7 °C) (11/02/22 0701)  Pulse: 104 (11/02/22 1000)  Resp: (!) 31 (11/02/22 1000)  BP: (!) 152/94 (11/02/22 1000)  SpO2: 95 % (11/02/22 1000)   Vital Signs (24h Range):  Temp:  [97.3 °F (36.3 °C)-98.1 °F (36.7 °C)] 98.1 °F (36.7 °C)  Pulse:  [102-123] 104  Resp:  [13-31] 31  SpO2:  [92 %-100 %] 95 %  BP: (132-170)/() 152/94     Weight: 126.1 kg (278 lb)  Body mass index is 34.75 kg/m².    Intake/Output Summary (Last 24 hours) at 11/2/2022 1239  Last data filed at 11/2/2022 0800  Gross per 24 hour   Intake 1878.72 ml   Output 4150 ml   Net -2271.28 ml      Physical Exam    Constitutional:       Appearance: He is obese. He is ill-appearing.   HENT:      Head: Normocephalic and atraumatic.      Nose: Nose normal.   Eyes:      Pupils: Pupils are equal, round, and reactive to light.   Cardiovascular:      Rate and Rhythm: Normal rate and regular rhythm.   Pulmonary:      Effort: Pulmonary effort is normal. No respiratory distress.      Comments: Symmetric chest rise.     Abdominal:      General: There is no distension.      Palpations: Abdomen is soft.   Musculoskeletal:         General: No deformity or signs of injury.      Right lower leg: No edema.      Left lower leg: No edema.   Skin:     General: Skin is warm and dry.   Neurological:      General: No focal deficit present.      Mental Status: He is alert and oriented to person, place, and time.      Constitutional: Patient is in severe distress. Well-developed and well-nourished.  Head: Atraumatic. Normocephalic.  Eyes: PERRL. EOM intact. Conjunctivae are not pale. No scleral icterus.  ENT: dentures  Neck: Supple. Full ROM. No lymphadenopathy.  Cardiovascular: Regular rate. Regular rhythm. No murmurs, rubs, or gallops. Distal pulses are 2+ and symmetric.  Pulmonary/Chest: BVM in process  Abdominal: Soft and non-distended.  There is no  tenderness.  No rebound, guarding, or rigidity.  Musculoskeletal: No edema.   Skin: Warm and dry.  Neurological:  intubated and sedated;     Significant Labs:     Recent Labs   Lab 10/31/22  0450 11/01/22  0417   WBC 14.14* 11.66   HGB 12.7* 11.9*   HCT 39.4* 38.0*    237      CMP:        Recent Labs   Lab 10/31/22  0450 11/01/22  0417    143   K 4.0 3.7    107   CO2 29 27   * 107   BUN 20 14   CREATININE 0.7 0.6   CALCIUM 8.1* 8.1*   ANIONGAP 7* 9       Significant Imaging:

## 2022-11-02 NOTE — PROGRESS NOTES
[facility] ([facility])  Critical Care Medicine  Progress Note    Patient Name: Brady Haines  MRN: [id number]  Admission Date: 10/30/2022  Hospital Length of Stay: 3 days  Code Status: Full Code  Attending Provider: Guerline Arevalo, *  Primary Care Provider: Mickie Hui MD   Principal Problem: <principal problem not specified>    Subjective:     HPI:  Brady Haines is 72 y.o.  Seen in ER  Spouse at bedside  Brought from [facility] found down< Bagged and intubated in ER  Unresponsive, Hypothermia, elevated pCO2  Unable to intubate in field  Got rocoronium + etomidate  pCO2 was 98  Never smoker, deny COPD, marie,   HX long hospitalization May 2022: [facility]: Spine Abcess: Staphy and acinetobacter  Was in NH  Unable to do PT due to Knee issues  T max: 95.1 F      Meds reveiwed: on Eliquis      No past medical history on file.     Hospital/ICU Course:  10/30:  Intubated.  Broad Abx.  10/31:  Remains on vent.  Sedated.  Significant atelectasis on CXR. Bronch for airway clearance. Cotninue CFP.  Discontinue vanco, Flagyl.  Resume Eliquis.  11/1:    Initally intubated and sedated on Propofol.  Subsequently extubated.  Continue CFP.  Cultures remain pending.  Continue eliquis.  Was on norepi @ 0.02 this morning; however that has since been weaned off.   Cr 0.6.  I/O net +957 over prior 24h.  Give lasix 20mg IV x1 for volume management; however low threshold to give fluid back if hypotensive again.  11/2:   Remains extubated and off norepi.  Cr 0.6, I/O net -2.479L over last 24h.  Giove lasix 20 IV x1 again today.  Start metoprolol XL, norvasc for HTN.  PT, OT.      Interval History:     Unable to obtain interval history or ROS from patient due to intubation and sedation.      Objective:     Vital Signs (Most Recent):  Temp: 97.9 °F (36.6 °C) (11/02/22 0300)  Pulse: 107 (11/02/22 0743)  Resp: 17 (11/02/22 0743)  BP: (!) 164/92 (11/02/22 0600)  SpO2: (!) 94 % (11/02/22 0743)   Vital  Signs (24h Range):  Temp:  [97.3 °F (36.3 °C)-98.1 °F (36.7 °C)] 97.9 °F (36.6 °C)  Pulse:  [] 107  Resp:  [11-26] 17  SpO2:  [92 %-100 %] 94 %  BP: (100-179)/() 164/92     Weight: 126.1 kg (278 lb)  Body mass index is 34.75 kg/m².      Intake/Output Summary (Last 24 hours) at 11/2/2022 0825  Last data filed at 11/2/2022 0600  Gross per 24 hour   Intake 2479.35 ml   Output 4985 ml   Net -2505.65 ml       Physical Exam  Vitals reviewed.   Constitutional:       Appearance: He is obese. He is ill-appearing.   HENT:      Head: Normocephalic and atraumatic.      Nose: Nose normal.   Eyes:      Pupils: Pupils are equal, round, and reactive to light.   Cardiovascular:      Rate and Rhythm: Normal rate and regular rhythm.   Pulmonary:      Effort: Pulmonary effort is normal. No respiratory distress.      Comments: Symmetric chest rise.    Abdominal:      General: There is no distension.      Palpations: Abdomen is soft.   Musculoskeletal:         General: No deformity or signs of injury.      Right lower leg: No edema.      Left lower leg: No edema.   Skin:     General: Skin is warm and dry.   Neurological:      General: No focal deficit present.      Mental Status: He is alert and oriented to person, place, and time.       Vents:  Vent Mode: Spont (11/01/22 1111)  Ventilator Initiated: Yes (10/30/22 0846)  Set Rate: 0 BPM (11/01/22 1111)  Vt Set: 470 mL (11/01/22 1111)  Pressure Support: 5 cmH20 (11/01/22 1111)  PEEP/CPAP: 8 cmH20 (11/01/22 1111)  Oxygen Concentration (%): 36 (11/02/22 0743)  Peak Airway Pressure: 14 cmH2O (11/01/22 1111)  Plateau Pressure: 20 cmH20 (11/01/22 1111)  Total Ve: 9.94 L/m (11/01/22 1111)  F/VT Ratio<105 (RSBI): (!) 17.36 (11/01/22 1111)    Lines/Drains/Airways       Central Venous Catheter Line  Duration             Percutaneous Central Line Insertion/Assessment - Triple Lumen  10/30/22 1051 right internal jugular 2 days              Drain  Duration                  Urethral  Catheter 10/30/22 1000 16 Fr. 2 days              Peripheral Intravenous Line  Duration                  Peripheral IV - Single Lumen 10/30/22 0845 20 G Left Hand 2 days         Peripheral IV - Single Lumen 10/30/22 1105 20 G Right Antecubital 2 days                    Significant Labs:    CBC/Anemia Profile:  Recent Labs   Lab 11/01/22  0417 11/02/22  0525   WBC 11.66 10.56   HGB 11.9* 12.7*   HCT 38.0* 40.9    245   MCV 96 97   RDW 16.5* 16.7*        Chemistries:  Recent Labs   Lab 11/01/22  0417 11/02/22  0525    141   K 3.7 3.8    104   CO2 27 28   BUN 14 12   CREATININE 0.6 0.6   CALCIUM 8.1* 8.4*   MG 2.1 2.0       A1C: No results for input(s): HGBA1C in the last 48 hours.  Blood Culture:   No results for input(s): LABBLOO in the last 48 hours.    Coagulation: No results for input(s): PT, INR, APTT in the last 48 hours.  Lactic Acid:   No results for input(s): LACTATE in the last 48 hours.    Pathology Results  (Last 10 years)      None          POCT Glucose:   Recent Labs   Lab 11/01/22  1209 11/01/22  1902 11/02/22  0013   POCTGLUCOSE 110 106 83     Respiratory Culture:   No results for input(s): GSRESP, RESPIRATORYC in the last 48 hours.    Troponin:   No results for input(s): TROPONINI in the last 48 hours.    Urine Culture: No results for input(s): LABURIN in the last 48 hours.  Urine Studies:   No results for input(s): COLORU, APPEARANCEUA, PHUR, SPECGRAV, PROTEINUA, GLUCUA, KETONESU, BILIRUBINUA, OCCULTUA, NITRITE, UROBILINOGEN, LEUKOCYTESUR, RBCUA, WBCUA, BACTERIA, SQUAMEPITHEL, HYALINECASTS in the last 48 hours.    Invalid input(s): WRIGHTSUR      Significant Imaging:  I have reviewed all pertinent imaging results/findings within the past 24 hours.      ABG  Recent Labs   Lab 11/01/22  0825   PH 7.414   PO2 82   PCO2 47.2*   HCO3 30.2*   BE 6       Assessment/Plan:     Pulmonary  Right middle lobe pneumonia  Sputum culture  Possible aspiration  Abx: CEFEPIME  Chest CT  reviewed  Blood cultures    On mechanically assisted ventilation  Extubated 11/1    Atelectasis  Bronchodilators  hypersal nebs  Bronch 10/31 with mucus plug cleared in RLL.  Scattered mucus suctioned in bilat lungs.  Incentive spirometer    Acute respiratory failure with hypoxia  Acute hypercapnic and hypoxemic respiratory failure  Uses home oxygen: No  Signs & symptoms of acute resp failure:  increased work of breathing, lethargy and cyanosis.  Recent ABG reviewed: Yes  Contributing diagnoses: pneumonia, atelectasis    Plan:  -improving.  Supplemental oxygen as needed  -continue Abx  -Hypertonic saline nebs  -CPT          Cardiac/Vascular  Hypertension  Home meds: NORVASC, LOSARTAN, metoprolol XL.    Resume Norvasc and metoprolol XL today.    Hyperlipidemia  MED: LIPITOR    ID  COVID-19 virus antibody detected  Patient is vaccinated    COVID risk score 6     REMDESIVIR  DEXAMETHASONE  ZINC  Vit C  resp path PCR               Hematology  Acute deep vein thrombosis (DVT) of tibial vein of both lower extremities  Continue home eliquis    GI  Gastroesophageal reflux disease without esophagitis  PEPCID         Critical Care Daily Checklist:    A: Awake: RASS Goal/Actual Goal: RASS Goal: -2-->light sedation  Actual: Fleming Agitation Sedation Scale (RASS): Drowsy   B: Spontaneous Breathing Trial Performed? Spon. Breathing Trial Initiated?: Initiated (11/01/22 1000)   C: SAT & SBT Coordinated?  Extubated 11/01/2022                   D: Delirium: CAM-ICU Overall CAM-ICU: Negative   E: Early Mobility Performed? Yes   F: Feeding Goal: Goals: 1. Pt will be initiated onto EN feeding within 24-48hrs. 2. Pt will tolerate >60% of energy needs by RD follow up  Status: Nutrition Goal Status: new   Current Diet Order   Procedures    Diet Cardiac      AS: Analgesia/Sedation Sedation has been discontinued   T: Thromboembolic Prophylaxis Eliquis   H: HOB > 300 Yes   U: Stress Ulcer Prophylaxis (if needed) Pepcid   G: Glucose Control  Subcu insulin   B: Bowel Function Stool Occurrence: 1   I: Indwelling Catheter (Lines & Rojas) Necessity Remove central line and A-line  Rojas   D: De-escalation of Antimicrobials/Pharmacotherapies Continue CFP      Plan for the day/ETD Abx  Steroids. RDV for COVID    Code Status:  Family/Goals of Care: Full Code       Non-Critical Care Time: 40 minutes       Serjio Licona MD  Critical Care Medicine  [facility] ([facility])

## 2022-11-02 NOTE — PLAN OF CARE
Pt on bipap for majority of evening, on 4L NC when off bipap. Passed bedside swallow evaluation. VS stable. Education and safety reviewed

## 2022-11-02 NOTE — PROGRESS NOTES
[facility] ([facility] Medicine  Progress Note    Patient Name: Brady Haines  MRN: [id number]  Patient Class: IP- Inpatient   Admission Date: 10/30/2022  Length of Stay: 3 days  Attending Physician: Guerline Arevalo, *  Primary Care Provider: Mickie Hui MD        Subjective:     Principal Problem:COVID        HPI:  Patient was intubated and sedated at the time of examination, got most of information from documentation    Brady Haines is a 72 y.o. male patient with a PMHx of Hypertension and Sepsis due to methicillin susceptible Staphylococcus aureus (MSSA) with acute hypercapnic respiratory failure without septic shock (HCC) (5/2/2022) who presents to the Emergency Department for evaluation of unresponsiveness. Pt was found unresponsive at a nursing home this morning. Per EMS, pt was found with agonal breathing and O2 sat of 40%. Pt was found with blood glucose of 100. EMS tried to intubate en route but was unsuccessful. EMS was also unsuccessful at starting an IV. Per EMS, pt responds to painful stimuli. Symptoms are constant and moderate in severity. HPI and ROS limited due to pt being unresponsive.    Patient is a prison resident of [facility] and requires 24/7 care and support for all activities. As per wife- patient has a history of prolonged  hospitalization at [facility] May [facility]: OL due Spine Abscess: (Staph and acinetobacter)  Unable to participate in physical therapy due to knee joint issues ;           Overview/Hospital Course:  10/31     Examination done at bedside, patient currently intubated, sedated, pressor support as needed to maintain map above 65;  Possible bronch given finding of atelectasis.    Remedesvir, dexamethasone for COVID    11/1:  Examination done at bedside, intubated, sedated, plan for SBT trial today   Status post bronch yesterday.  Labs reviewed   Tachycardic, hypertensive  COVID management    11/2     Examination done at  bedside.  Patient got extubated currently on 3 L nasal cannula   Off of drips , on p.o. blood pressure medications   Passed bedside swallow evaluation   Labs reviewed, creatinine stable   Medications for COVID                  Review of Systems      HENT:  Negative for sore throat.    Respiratory:  shortness of breath    Cardiovascular:  Negative for chest pain.   Gastrointestinal:  Negative for nausea and vomiting.   Endocrine: -ve for polyuria   Genitourinary:  Negative for dysuria.   Musculoskeletal:  Negative for back pain.   Skin:  Negative for rash.   Neurological:  Negative for weakness.   Hematological:  Does not bruise/bleed easily.   All other systems reviewed and are negative.      Objective:     Vital Signs (Most Recent):  Temp: 98 °F (36.7 °C) (11/01/22 1115)  Pulse: (!) 119 (11/01/22 1200)  Resp: 16 (11/01/22 1200)  BP: (!) 169/92 (11/01/22 1200)  SpO2: 95 % (11/01/22 1200)   Vital Signs (24h Range):  Temp:  [97.1 °F (36.2 °C)-98.9 °F (37.2 °C)] 98 °F (36.7 °C)  Pulse:  [] 119  Resp:  [11-31] 16  SpO2:  [95 %-100 %] 95 %  BP: ()/() 169/92     Weight: 126.1 kg (278 lb)  Body mass index is 34.75 kg/m².    Intake/Output Summary (Last 24 hours) at 11/1/2022 1244  Last data filed at 11/1/2022 1200  Gross per 24 hour   Intake 3547.01 ml   Output 3270 ml   Net 277.01 ml      Physical Exam    Constitutional:       Appearance: He is obese. He is ill-appearing.   HENT:      Head: Normocephalic and atraumatic.      Nose: Nose normal.   Eyes:      Pupils: Pupils are equal, round, and reactive to light.   Cardiovascular:      Rate and Rhythm: Normal rate and regular rhythm.   Pulmonary:      Effort: Pulmonary effort is normal. No respiratory distress.      Comments: Symmetric chest rise.     Abdominal:      General: There is no distension.      Palpations: Abdomen is soft.   Musculoskeletal:         General: No deformity or signs of injury.      Right lower leg: No edema.      Left lower leg: No  edema.   Skin:     General: Skin is warm and dry.   Neurological:      General: No focal deficit present.      Mental Status: He is alert and oriented to person, place, and time.     Constitutional: Patient is in severe distress. Well-developed and well-nourished.  Head: Atraumatic. Normocephalic.  Eyes: PERRL. EOM intact. Conjunctivae are not pale. No scleral icterus.  ENT: dentures  Neck: Supple. Full ROM. No lymphadenopathy.  Cardiovascular: Regular rate. Regular rhythm. No murmurs, rubs, or gallops. Distal pulses are 2+ and symmetric.  Pulmonary/Chest: BVM in process  Abdominal: Soft and non-distended.  There is no tenderness.  No rebound, guarding, or rigidity.  Musculoskeletal: No edema.   Skin: Warm and dry.  Neurological:  intubated and sedated;     Significant Labs: All pertinent labs within the past 24 hours have been reviewed.  CBC:   Recent Labs   Lab 10/31/22  0450 11/01/22  0417   WBC 14.14* 11.66   HGB 12.7* 11.9*   HCT 39.4* 38.0*    237     CMP:   Recent Labs   Lab 10/31/22  0450 11/01/22  0417    143   K 4.0 3.7    107   CO2 29 27   * 107   BUN 20 14   CREATININE 0.7 0.6   CALCIUM 8.1* 8.1*   ANIONGAP 7* 9     Cardiac Markers: No results for input(s): CKMB, MYOGLOBIN, BNP, TROPISTAT in the last 48 hours.  Coagulation: No results for input(s): PT, INR, APTT in the last 48 hours.  Lactic Acid: No results for input(s): LACTATE in the last 48 hours.    Significant Imaging:     Imaging Results              CTA Chest Non-Coronary (PE Studies) (Final result)  Result time 10/30/22 13:44:24      Final result by Raheem Hendrickson MD (10/30/22 13:44:24)                   Impression:      1. No pulmonary embolism.  2. Mucous plug in the right mainstem bronchus with total atelectasis of the right middle lobe and right lower lobe.  3. Marked dependent atelectasis in the basilar segments left lower lobe and dependent atelectasis in the right upper lobe.  4. Extensive calcific  atherosclerotic disease of the coronary arteries.  All CT scans at this facility are performed  using dose modulation techniques as appropriate to performed exam including the following:  automated exposure control; adjustment of mA and/or kV according to the patients size (this includes techniques or standardized protocols for targeted exams where dose is matched to indication/reason for exam: i.e. extremities or head);  iterative reconstruction technique.      Electronically signed by: Raheem Hendrickson MD  Date:    10/30/2022  Time:    13:44               Narrative:    EXAMINATION:  CTA CHEST NON CORONARY (PE STUDIES)    CLINICAL HISTORY:  Pulmonary embolism (PE) suspected, high prob;    TECHNIQUE:  Axial CTA images performed through the chest after the administration of 100 cc intravenous contrast. 3D MIP images were performed and interpreted.    COMPARISON:  None    FINDINGS:  No filling defects in the pulmonary arteries to indicate a pulmonary embolism.  Calcific atherosclerotic disease of the coronary arteries.  No pericardial effusion.  NG tube tip in the fundus of the stomach.  Right IJ central venous catheter tip in the SVC.    There is a mucous plug in the right mainstem bronchus with total atelectasis of the right middle lobe and right lower lobe.  There is dependent atelectasis in the right upper lobe.  There is marked atelectasis in the basilar segments of the left lower lobe.  No pleural effusion or pneumothorax.    No acute osseous abnormality.                                       X-Ray Chest 1 View (Final result)  Result time 10/30/22 11:06:21      Final result by Raheem Hendrickson MD (10/30/22 11:06:21)                   Impression:      See above      Electronically signed by: Raheem Hendrickson MD  Date:    10/30/2022  Time:    11:06               Narrative:    EXAMINATION:  XR CHEST 1 VIEW    CLINICAL HISTORY:  Central line plcmnt;    COMPARISON:  Chest x-ray performed less than 2 hours  ago    FINDINGS:  Endotracheal tube in good position.  NG tube tip in the fundus of the stomach.  Newly placed right IJ central venous catheter tip in the SVC.  No pneumothorax.  Right middle lobe and right lower lobe total atelectasis.  Marked left basilar atelectasis.                                       CT Head Without Contrast (Final result)  Result time 10/30/22 09:52:01      Final result by Raheem Hendrickson MD (10/30/22 09:52:01)                   Impression:      1. No acute intracranial process.  2. 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  3. Fluid filling the right middle ear cavity which can be seen with otitis media.  4. Paranasal sinus disease.  All CT scans at this facility are performed  using dose modulation techniques as appropriate to performed exam including the following:  automated exposure control; adjustment of mA and/or kV according to the patients size (this includes techniques or standardized protocols for targeted exams where dose is matched to indication/reason for exam: i.e. extremities or head);  iterative reconstruction technique.      Electronically signed by: Raheem Hendrickson MD  Date:    10/30/2022  Time:    09:52               Narrative:    EXAMINATION:  CT HEAD WITHOUT CONTRAST    CLINICAL HISTORY:  Mental status change, unknown cause;    TECHNIQUE:  Axial CT imaging was performed through the head without intravenous contrast.    COMPARISON:  None    FINDINGS:  No hydrocephalus, midline shift, mass effect, or acute intracranial hemorrhage. Chronic small vessel ischemic change of the white matter.  Old lacunar infarcts in the left caudate nucleus head and body.  Mucosal thickening of the paranasal sinuses.  Fluid filling the right middle ear cavity..  The skull is intact.                                       X-Ray Chest AP Portable (Final result)  Result time 10/30/22 09:26:02      Final result by Raheem Hendrickson MD  (10/30/22 09:26:02)                   Impression:      See above      Electronically signed by: Raheem Hendrickson MD  Date:    10/30/2022  Time:    09:26               Narrative:    EXAMINATION:  XR CHEST AP PORTABLE    CLINICAL HISTORY:  Presence of other specified devices    COMPARISON:  None.    FINDINGS:  Endotracheal tube in good position.  The NG tube courses down the esophagus with its tip in the fundus of the stomach.  There are markedly elevated hemidiaphragms with marked low lung volumes.  Right middle lobe and right lower lobe total atelectasis with a cut off sign of the right mainstem bronchus.  Marked left basilar atelectasis.  No pneumothorax.                                         Assessment/Plan:      Right middle lobe pneumonia  CTA chest: No pulmonary embolism. Mucous plug in the right mainstem bronchus with total atelectasis of the right middle lobe and right lower lobe.   Marked dependent atelectasis in the basilar segments left lower lobe and dependent atelectasis in the right upper lobe. Extensive calcific atherosclerotic disease of the coronary arteries.  All CT scans at this facility are performed  using dose modulation techniques as appropriate to performed exam including the following:  automated exposure control; adjustment of mA and/or kV according to the patients size (this includes techniques or standardized protocols for targeted exams where dose is matched to indication/reason for exam: i.e. extremities or head);  iterative reconstruction technique.Sputum culture  ? Aspiration pneumonia with mucus plug;   Possible plan for bronchoscopy, antibiotics, follow-up on cultures      COVID-19 virus antibody detected  COVID positive, intubated  Remedesvir, dexamethasone      On mechanically assisted ventilation  Intubated  Spontaneous breathing trials   ABG      Acute deep vein thrombosis (DVT) of tibial vein of both lower extremities  On Eliquis  CTA chest negative for PE      Hypertension  Home  meds  Trops -ve  echo      Atelectasis  Bronchodilators  Possible bronch for mucus plugs       Acute respiratory failure with hypoxia    Intubated   Bronch for underlying atelectasis    11/1:  S/p bronch as of 10/31      VTE Risk Mitigation (From admission, onward)         Ordered     apixaban tablet 5 mg  2 times daily         11/02/22 0747     IP VTE HIGH RISK PATIENT  Once         10/30/22 1513     Place MELANIE hose  Until discontinued         10/30/22 1214     Place sequential compression device  Until discontinued         10/30/22 1214     Place sequential compression device  Until discontinued         10/30/22 1211                Discharge Planning   ABDIAS:      Code Status: Full Code   Is the patient medically ready for discharge?:     Reason for patient still in hospital (select all that apply): NC, BP management;   Discharge Plan A: Return to nursing home            Critical care time spent on the evaluation and treatment of severe organ dysfunction, review of pertinent labs and imaging studies, discussions with consulting providers and discussions with patient/family: 57 minutes.      Guerline Arevalo MD  Department of Hospital Medicine   [facility] ([facility])

## 2022-11-02 NOTE — PT/OT/SLP EVAL
Physical Therapy Evaluation    Patient Name:  Brady Haines   MRN:  [id number]    Recommendations:     Discharge Recommendations:  nursing facility, skilled   Discharge Equipment Recommendations:  (TBD)   Barriers to discharge: None    Assessment:     Brady Haines is a 72 y.o. male admitted with a medical diagnosis of COVID.  He presents with the following impairments/functional limitations:  weakness, impaired endurance, impaired functional mobility, gait instability, impaired balance, impaired cognition, decreased safety awareness which limits mobility and increases caregiver burden/need.    Rehab Prognosis: Good; patient would benefit from acute skilled PT services to address these deficits and reach maximum level of function.    Recent Surgery: * No surgery found *      Plan:     During this hospitalization, patient to be seen 3 x/week to address the identified rehab impairments via gait training, therapeutic activities, therapeutic exercises, neuromuscular re-education and progress toward the following goals:    Plan of Care Expires:  11/16/22    Subjective     Chief Complaint: COVID, increased SOB  Patient/Family Comments/goals: to go home   Pain/Comfort:  Pain Rating 1: 0/10    Patients cultural, spiritual, Sikh conflicts given the current situation: no    Living Environment:  Pt lives in [facility] with wife, 4 steps at entry with bilateral hand rails.   Prior to admission, patients level of function was independent, limited ambulation in home only from .  Pt was in NH for therapy immediately prior to hospital admission. Equipment used at home: wheelchair, rollator, bath bench (built in bench).  DME owned (not currently used): none.  Upon discharge, patient will have assistance from unknown; pt reported that his wife also requires assistance.    Objective:     Communicated with nurse prior to session.  Patient found  supine with HOB elevated eating breakfast  with telemetry, oxygen, muñiz catheter, SCD,  peripheral IV, pulse ox (continuous), blood pressure cuff, pressure relief boots  upon PT entry to room.    General Precautions: Standard, fall, droplet, contact, airborne   Orthopedic Precautions:N/A   Braces: N/A  Respiratory Status: Nasal cannula, flow 4 L/min    Exams:  RLE ROM: impaired  RLE Strength: impaired  LLE ROM: impaired  LLE Strength: impaired    Functional Mobility:  Bed Mobility:     Rolling Right: moderate assistance  Scooting: maximal assistance  Supine to Sit: maximal assistance  Sit to Supine: maximal assistance      AM-PAC 6 CLICK MOBILITY  Total Score:8       Treatment & Education:  Facilitated training to improved safety and sequecing during bed mobility. Increased time needed and several rest breaks. Tolerated sitting EOB x 5-7 mins with manual assist to maintain balance. Decreased ability to sit upright without R lateral lean and UE assist.     Patient left  supine in bed with head of bed elevated  with all lines intact, call button in reach, bed alarm on, and nursing notified.    GOALS:   Multidisciplinary Problems       Physical Therapy Goals          Problem: Physical Therapy    Goal Priority Disciplines Outcome Goal Variances Interventions   Physical Therapy Goal     PT, PT/OT      Description: Pt will perform bed mobility independently in order to participate in EOB activity.  Pt will perform transfers independently in order to participate in OOB activity.   Pt will ambulate 35 ft x 2 SPV with LRAD in order to participate in daily tasks.    Pt will tolerate sitting OOB x 2-4 hrs in order to promote overall activity tolerance.                        History:     Past Medical History:   Diagnosis Date    Essential (primary) hypertension     Mixed hyperlipidemia        History reviewed. No pertinent surgical history.    Time Tracking:     PT Received On: 11/02/22  PT Start Time: 0945     PT Stop Time: 1025  PT Total Time (min): 40 min     Billable Minutes: Evaluation 15 and Therapeutic  Activity 25      11/02/2022

## 2022-11-02 NOTE — PLAN OF CARE
Nutrition Recommendations 11/2:  1. Recommend continue Cardiac diet  2. Recommend Boost plus BID to fill any nutritional gaps  3. Recommend continue bowel regimen  4. Collaboration of care with medical providers   Gail Degrootitijohn paul

## 2022-11-02 NOTE — PLAN OF CARE
OT rosi completed. Max A for rolling and bed mobility. Pt weak and fatigued at this time. Recommends SNF.

## 2022-11-03 VITALS
WEIGHT: 278 LBS | HEIGHT: 75 IN | OXYGEN SATURATION: 93 % | RESPIRATION RATE: 20 BRPM | HEART RATE: 87 BPM | SYSTOLIC BLOOD PRESSURE: 152 MMHG | TEMPERATURE: 98 F | BODY MASS INDEX: 34.57 KG/M2 | DIASTOLIC BLOOD PRESSURE: 89 MMHG

## 2022-11-03 DIAGNOSIS — U07.1 COVID-19 VIRUS DETECTED: ICD-10-CM

## 2022-11-03 LAB
ANION GAP SERPL CALC-SCNC: 10 MMOL/L (ref 8–16)
BASOPHILS # BLD AUTO: 0.01 K/UL (ref 0–0.2)
BASOPHILS NFR BLD: 0.1 % (ref 0–1.9)
BUN SERPL-MCNC: 16 MG/DL (ref 8–23)
CALCIUM SERPL-MCNC: 8.6 MG/DL (ref 8.7–10.5)
CHLORIDE SERPL-SCNC: 102 MMOL/L (ref 95–110)
CO2 SERPL-SCNC: 27 MMOL/L (ref 23–29)
CREAT SERPL-MCNC: 0.6 MG/DL (ref 0.5–1.4)
DIFFERENTIAL METHOD: ABNORMAL
EOSINOPHIL # BLD AUTO: 0 K/UL (ref 0–0.5)
EOSINOPHIL NFR BLD: 0.1 % (ref 0–8)
ERYTHROCYTE [DISTWIDTH] IN BLOOD BY AUTOMATED COUNT: 15.9 % (ref 11.5–14.5)
EST. GFR  (NO RACE VARIABLE): >60 ML/MIN/1.73 M^2
GLUCOSE SERPL-MCNC: 78 MG/DL (ref 70–110)
HCT VFR BLD AUTO: 42.8 % (ref 40–54)
HGB BLD-MCNC: 13.7 G/DL (ref 14–18)
IMM GRANULOCYTES # BLD AUTO: 0.11 K/UL (ref 0–0.04)
IMM GRANULOCYTES NFR BLD AUTO: 1 % (ref 0–0.5)
LYMPHOCYTES # BLD AUTO: 2.3 K/UL (ref 1–4.8)
LYMPHOCYTES NFR BLD: 21.5 % (ref 18–48)
MAGNESIUM SERPL-MCNC: 2 MG/DL (ref 1.6–2.6)
MCH RBC QN AUTO: 30 PG (ref 27–31)
MCHC RBC AUTO-ENTMCNC: 32 G/DL (ref 32–36)
MCV RBC AUTO: 94 FL (ref 82–98)
MONOCYTES # BLD AUTO: 0.8 K/UL (ref 0.3–1)
MONOCYTES NFR BLD: 8 % (ref 4–15)
NEUTROPHILS # BLD AUTO: 7.3 K/UL (ref 1.8–7.7)
NEUTROPHILS NFR BLD: 69.3 % (ref 38–73)
NRBC BLD-RTO: 0 /100 WBC
PLATELET # BLD AUTO: 262 K/UL (ref 150–450)
PMV BLD AUTO: 9.3 FL (ref 9.2–12.9)
POCT GLUCOSE: 103 MG/DL (ref 70–110)
POCT GLUCOSE: 105 MG/DL (ref 70–110)
POCT GLUCOSE: 82 MG/DL (ref 70–110)
POTASSIUM SERPL-SCNC: 3.7 MMOL/L (ref 3.5–5.1)
RBC # BLD AUTO: 4.56 M/UL (ref 4.6–6.2)
SODIUM SERPL-SCNC: 139 MMOL/L (ref 136–145)
WBC # BLD AUTO: 10.51 K/UL (ref 3.9–12.7)

## 2022-11-03 PROCEDURE — 94640 AIRWAY INHALATION TREATMENT: CPT

## 2022-11-03 PROCEDURE — 94664 DEMO&/EVAL PT USE INHALER: CPT

## 2022-11-03 PROCEDURE — 97530 THERAPEUTIC ACTIVITIES: CPT | Mod: CQ

## 2022-11-03 PROCEDURE — 99900035 HC TECH TIME PER 15 MIN (STAT)

## 2022-11-03 PROCEDURE — 25000003 PHARM REV CODE 250: Performed by: INTERNAL MEDICINE

## 2022-11-03 PROCEDURE — 94799 UNLISTED PULMONARY SVC/PX: CPT

## 2022-11-03 PROCEDURE — 63600175 PHARM REV CODE 636 W HCPCS: Mod: TB | Performed by: INTERNAL MEDICINE

## 2022-11-03 PROCEDURE — 27000221 HC OXYGEN, UP TO 24 HOURS

## 2022-11-03 PROCEDURE — 85025 COMPLETE CBC W/AUTO DIFF WBC: CPT | Performed by: INTERNAL MEDICINE

## 2022-11-03 PROCEDURE — 83735 ASSAY OF MAGNESIUM: CPT | Performed by: INTERNAL MEDICINE

## 2022-11-03 PROCEDURE — 80048 BASIC METABOLIC PNL TOTAL CA: CPT | Performed by: INTERNAL MEDICINE

## 2022-11-03 PROCEDURE — 94761 N-INVAS EAR/PLS OXIMETRY MLT: CPT

## 2022-11-03 PROCEDURE — 27000646 HC AEROBIKA DEVICE

## 2022-11-03 PROCEDURE — 63600175 PHARM REV CODE 636 W HCPCS: Performed by: INTERNAL MEDICINE

## 2022-11-03 PROCEDURE — 94618 PULMONARY STRESS TESTING: CPT

## 2022-11-03 PROCEDURE — 94668 MNPJ CHEST WALL SBSQ: CPT

## 2022-11-03 PROCEDURE — 25000242 PHARM REV CODE 250 ALT 637 W/ HCPCS: Performed by: INTERNAL MEDICINE

## 2022-11-03 RX ORDER — PREDNISONE 20 MG/1
40 TABLET ORAL DAILY
Qty: 6 TABLET | Refills: 0
Start: 2022-11-03 | End: 2022-11-06

## 2022-11-03 RX ORDER — AMOXICILLIN AND CLAVULANATE POTASSIUM 875; 125 MG/1; MG/1
1 TABLET, FILM COATED ORAL EVERY 12 HOURS
Start: 2022-11-03 | End: 2022-11-07

## 2022-11-03 RX ADMIN — CEFEPIME 2 G: 2 INJECTION, POWDER, FOR SOLUTION INTRAVENOUS at 03:11

## 2022-11-03 RX ADMIN — APIXABAN 5 MG: 2.5 TABLET, FILM COATED ORAL at 09:11

## 2022-11-03 RX ADMIN — AMLODIPINE BESYLATE 5 MG: 5 TABLET ORAL at 09:11

## 2022-11-03 RX ADMIN — DEXAMETHASONE 6 MG: 4 TABLET ORAL at 09:11

## 2022-11-03 RX ADMIN — SODIUM CHLORIDE 30 MG/ML INHALATION SOLUTION 4 ML: 30 SOLUTION INHALANT at 07:11

## 2022-11-03 RX ADMIN — MUPIROCIN: 20 OINTMENT TOPICAL at 09:11

## 2022-11-03 RX ADMIN — POLYETHYLENE GLYCOL 3350 17 G: 17 POWDER, FOR SOLUTION ORAL at 09:11

## 2022-11-03 RX ADMIN — ZINC SULFATE 220 MG (50 MG) CAPSULE 220 MG: CAPSULE at 09:11

## 2022-11-03 RX ADMIN — OXYCODONE HYDROCHLORIDE AND ACETAMINOPHEN 1000 MG: 500 TABLET ORAL at 09:11

## 2022-11-03 RX ADMIN — CEFEPIME 2 G: 2 INJECTION, POWDER, FOR SOLUTION INTRAVENOUS at 10:11

## 2022-11-03 RX ADMIN — METOPROLOL SUCCINATE 50 MG: 50 TABLET, EXTENDED RELEASE ORAL at 09:11

## 2022-11-03 RX ADMIN — REMDESIVIR 100 MG: 100 INJECTION, POWDER, LYOPHILIZED, FOR SOLUTION INTRAVENOUS at 09:11

## 2022-11-03 RX ADMIN — FAMOTIDINE 20 MG: 20 TABLET ORAL at 09:11

## 2022-11-03 RX ADMIN — SODIUM CHLORIDE 30 MG/ML INHALATION SOLUTION 4 ML: 30 SOLUTION INHALANT at 12:11

## 2022-11-03 RX ADMIN — SENNOSIDES AND DOCUSATE SODIUM 2 TABLET: 50; 8.6 TABLET ORAL at 09:11

## 2022-11-03 NOTE — PLAN OF CARE
Pt remains on 2l NC throughout shift. Vital signs stable. Orders to transfer to floor. Education and safety reviewed

## 2022-11-03 NOTE — PT/OT/SLP PROGRESS
"Physical Therapy  Treatment    Brady Haines   MRN: [id number]   Admitting Diagnosis: COVID    PT Received On: 11/03/22  PT Start Time: 1135     PT Stop Time: 1150    PT Total Time (min): 15 min       Billable Minutes:  Therapeutic Activity 15    Treatment Type: Treatment  PT/PTA: PTA     PTA Visit Number: 1       General Precautions: Standard, airborne, contact, droplet, fall  Orthopedic Precautions: N/A   Braces: N/A  Respiratory Status: Nasal cannula, flow 4 L/min    Spiritual, Cultural Beliefs, Adventism Practices, Values that Affect Care: no    Subjective:  Communicated with nursing staff, Blake and completed Epic chart review prior to session.  Patient agreeable to PT.    Pain/Comfort  Pain Rating 1: 0/10    Objective:   Patient found with: blood pressure cuff, central line, muñiz catheter, oxygen, pressure relief boots, pulse ox (continuous)    Functional Mobility:  Bed Mobility:    Supine <>sit: moderate assistance  Scooting to edge of bed: moderate assistance    Transfers:   Declined    Treatment and Education:  Static sitting at edge of bed x 10 minutes to promote erect posture and WB to BLEs, Supervision provided for patient safety.     Pt educated on the following: Pt verbally agreed in understanding.   Continue therapuetic exercises in bed throughought the day to increase activity tolerance and decrease risk for pneumonia and blood clots.  Elevate HOB or sit EOB for all 3 meals and when guests visit.    "Call, don't fall" procedure of pressing red button on call bell for all transfers.         AM-PAC 6 CLICK MOBILITY  How much help from another person does this patient currently need?   1 = Unable, Total/Dependent Assistance  2 = A lot, Maximum/Moderate Assistance  3 = A little, Minimum/Contact Guard/Supervision  4 = None, Modified [place]/Independent    Turning over in bed (including adjusting bedclothes, sheets and blankets)?: 2  Sitting down on and standing up from a chair with arms (e.g., " wheelchair, bedside commode, etc.): 1  Moving from lying on back to sitting on the side of the bed?: 2  Moving to and from a bed to a chair (including a wheelchair)?: 1  Need to walk in hospital room?: 1  Climbing 3-5 steps with a railing?: 1  Basic Mobility Total Score: 8    AM-PAC Raw Score CMS G-Code Modifier Level of Impairment Assistance   6 % Total / Unable   7 - 9 CM 80 - 100% Maximal Assist   10 - 14 CL 60 - 80% Moderate Assist   15 - 19 CK 40 - 60% Moderate Assist   20 - 22 CJ 20 - 40% Minimal Assist   23 CI 1-20% SBA / CGA   24 CH 0% Independent/ Mod I     Patient left with bed in chair position with all lines intact, call button in reach, bed alarm on, and nursing  notified.  All needs met.     Assessment:  Brady Haines is a 72 y.o. male with a medical diagnosis of COVID and presents with overall decline in functional mobility. Patient tolerated therapy session fair this date as he was able to perform bed mobility tasks with moderate assistance. Patient declined transfer training due to fear of falling as patient states he knows his body is weaker than before. Will continue to progress toward goals per patient tolerance and PT plan of care.     Rehab identified problem list/impairments: Rehab identified problem list/impairments: weakness, impaired endurance, impaired self care skills, impaired functional mobility, gait instability, impaired balance, impaired cognition, decreased upper extremity function, decreased lower extremity function, decreased safety awareness, impaired cardiopulmonary response to activity    Rehab potential is fair.    Activity tolerance: Fair    Discharge recommendations: Discharge Facility/Level of Care Needs: nursing facility, skilled     Barriers to discharge:      Equipment recommendations: Equipment Needed After Discharge:  (TBD)     GOALS:   Multidisciplinary Problems       Physical Therapy Goals          Problem: Physical Therapy    Goal Priority Disciplines Outcome  Goal Variances Interventions   Physical Therapy Goal     PT, PT/OT      Description: Pt will perform bed mobility independently in order to participate in EOB activity.  Pt will perform transfers independently in order to participate in OOB activity.   Pt will ambulate 35 ft x 2 SPV with LRAD in order to participate in daily tasks.    Pt will tolerate sitting OOB x 2-4 hrs in order to promote overall activity tolerance.                        PLAN:    Patient to be seen 3 x/week  to address the above listed problems via therapeutic activities  Plan of Care expires: 11/16/22  Plan of Care reviewed with: patient         11/03/2022

## 2022-11-03 NOTE — PLAN OF CARE
Patient will return to his residents at [facility] under skilled nursing.  Notified Giorgio.      Referral sent via [facility].    Awaiting home 02 evaluation and discharge orders.  Patient will transfer back with [facility].       11/03/22 0941   Post-Acute Status   Post-Acute Authorization Placement   Post-Acute Placement Status Referrals Sent   Discharge Plan   Discharge Plan A Skilled Nursing Facility

## 2022-11-03 NOTE — DISCHARGE INSTRUCTIONS
Recommend patient to be compliant with medications   Recommend patient maintain airborne, contact, droplet precautions for 10 more days upon discharge.    Recommend patient to monitor fever, symptoms, to wear mask.    Recommend patient to be compliant with follow-up visits

## 2022-11-03 NOTE — NURSING
Report called to Carrie Jenkins LPN at [facility].  [facility]

## 2022-11-03 NOTE — DISCHARGE SUMMARY
[facility] Medicine  Discharge Summary      Patient Name: Brady Haines  MRN: [id number]  [facility]: [id number]  Patient Class: IP- Inpatient  Admission Date: 10/30/2022  Hospital Length of Stay: 4 days  Discharge Date and Time: No discharge date for patient encounter.  Attending Physician: Guerline Arevalo, *   Discharging Provider: Guerline Arevalo MD  Primary Care Provider: Mickie Hui MD    Primary Care Team: [facility] MEDICINE C    HPI:   Patient was intubated and sedated at the time of examination, got most of information from documentation    Brady Haines is a 72 y.o. male patient with a PMHx of Hypertension and Sepsis due to methicillin susceptible Staphylococcus aureus (MSSA) with acute hypercapnic respiratory failure without septic shock (HCC) (5/2/2022) who presents to the Emergency Department for evaluation of unresponsiveness. Pt was found unresponsive at a nursing home this morning. Per EMS, pt was found with agonal breathing and O2 sat of 40%. Pt was found with blood glucose of 100. EMS tried to intubate en route but was unsuccessful. EMS was also unsuccessful at starting an IV. Per EMS, pt responds to painful stimuli. Symptoms are constant and moderate in severity. HPI and ROS limited due to pt being unresponsive.    Patient is a [facility] resident of [facility] and requires 24/7 care and support for all activities. As per wife- patient has a history of prolonged  hospitalization at [facility] in May 2022: OLOl due Spine Abscess: (Staph and acinetobacter)  Unable to participate in physical therapy due to knee joint issues ;           * No surgery found *      Hospital Course:   10/31     Examination done at bedside, patient currently intubated, sedated, pressor support as needed to maintain map above 65;  Possible bronch given finding of atelectasis.    Remedesvir, dexamethasone for COVID    11/1:  Examination done at bedside, intubated,  sedated, plan for SBT trial today   Status post bronch yesterday.  Labs reviewed   Tachycardic, hypertensive  COVID management    11/2     Examination done at bedside.  Patient got extubated currently on 3 L nasal cannula   Off of drips , on p.o. blood pressure medications   Passed bedside swallow evaluation   Labs reviewed, creatinine stable   Medications for COVID    11/3    Examination of patient denied bedside.  Patient denied any acute issues, stated improvement in shortness of breath.    Hemodynamically stable, labs reviewed.    Underwent home oxygen evaluation- qualified for 4 L (93% on 4LPM )-  working on arrangements.    Considering clinical and hemodynamic stability plan to discharge today.  Provided instructions to be followed upon discharge.  Patient agreed to the current plan, transferring to [facility] with PT arrangements          Review of Systems     Constitutional:   Negative for chills and fever.   HENT:  Negative for congestion.    Eyes:  Negative for blurred vision.   Respiratory:  Negative for cough, sputum production and shortness of breath.    Cardiovascular:  Negative for chest pain and leg swelling.   Gastrointestinal:  Negative for abdominal pain, nausea and vomiting.   Genitourinary:  Negative for dysuria.   Musculoskeletal:   Negative for myalgias.   Skin:  Negative for rash.   Neurological:  Negative for dizziness, weakness and headaches.   Endo/Heme/Allergies:  Does not bruise/bleed easily.   Psychiatric/Behavioral:  The patient is not nervous/anxious.      Physical Exam     Constitutional:       General: He is awake. He is not in acute distress.     Appearance: He is well-developed. He is obese. . He is not toxic-appearing or diaphoretic.      Interventions: He is not intubated.Nasal cannula in place.   HENT:      Head: Normocephalic and atraumatic.      Mouth/Throat:      Mouth: Mucous membranes are moist.   Eyes:      General: Lids are normal.      Pupils: Pupils are  equal, round, and reactive to light.   Neck:      Trachea: Trachea normal.   Cardiovascular:      Rate and Rhythm: Normal rate and regular rhythm.      Pulses:           Radial pulses are 2+ on the right side and 2+ on the left side.        Dorsalis pedis pulses are 1+ on the right side and 1+ on the left side.      Heart sounds: Normal heart sounds.   Pulmonary:      Effort: Pulmonary effort is normal. No tachypnea, accessory muscle usage or respiratory distress. He is not intubated.      Breath sounds: Normal breath sounds.   Chest:      Chest wall: No deformity or tenderness.   Abdominal:      General:  There is no distension.      Palpations: Abdomen is soft.      Tenderness: There is no abdominal tenderness.      Genitourinary:     Penis: Normal.    Musculoskeletal:         General: Normal range of motion.      Cervical back: Normal range of motion.      Right lower leg: No edema.      Left lower leg: No edema.      Right foot: No deformity.      Left foot: No deformity.   Lymphadenopathy:      Cervical: No cervical adenopathy.   Skin:     General: Skin is warm and dry.      Capillary Refill: Capillary refill takes less than 2 seconds.      Findings: No rash.   Neurological:      General: No focal deficit present.      Mental Status: He is alert and oriented to person, place, and time.      GCS: GCS eye subscore is 4. GCS verbal subscore is 5. GCS motor subscore is 6.   Psychiatric:         Attention and Perception: Attention normal.             Speech: Speech normal.         Behavior: Behavior normal. Behavior is cooperative.         Thought Content: Thought content normal.         Cognition and Memory: Cognition normal.         Judgment: Judgment normal.      Goals of Care Treatment Preferences:  Code Status: Full Code      Consults:   Consults (From admission, onward)        Status Ordering Provider     IP consult to case management  Once        Provider:  (Not yet assigned)    Completed SHANNON ARMENDARIZ  R.     Inpatient consult to Registered Dietitian/Nutritionist  Once        Provider:  (Not yet assigned)    Completed MINGO ARANA     Inpatient consult to Critical Care Medicine  Once        Provider:  Breanne Schrader MD    Completed CAMMIE DURON          No new Assessment & Plan notes have been filed under this hospital service since the last note was generated.  Service: Hospital Medicine    Final Active Diagnoses:    Diagnosis Date Noted POA    PRINCIPAL PROBLEM:  COVID [U07.1] 10/31/2022 Yes    Acute hypoxemic respiratory failure [J96.01] 10/31/2022 Yes    Hypertension [I10] 10/30/2022 Yes    On mechanically assisted ventilation [Z99.11] 10/30/2022 Not Applicable    COVID-19 virus antibody detected [R76.8] 10/30/2022 Unknown    Right middle lobe pneumonia [J18.9] 10/30/2022 Unknown    Acute deep vein thrombosis (DVT) of tibial vein of both lower extremities [I82.443] 05/09/2022 Yes    Acute respiratory failure with hypoxia [J96.01] 05/05/2022 Yes    Atelectasis [J98.11] 05/05/2022 Yes    Gastroesophageal reflux disease without esophagitis [K21.9] 08/12/2020 Yes    Hyperlipidemia [E78.5] 11/22/2013 Yes      Problems Resolved During this Admission:       Discharged Condition: stable    Disposition: Home or Self Care    Follow Up:   Follow-up Information     Mickie Hui MD Follow up in 1 week(s).    Specialty: Internal Medicine  Contact information:  [address] [facility]  [address] [place] [id number] [phone]                       Patient Instructions:      Notify your health care provider if you experience any of the following:  temperature >100.4     Notify your health care provider if you experience any of the following:  persistent nausea and vomiting or diarrhea     Notify your health care provider if you experience any of the following:  severe uncontrolled pain     Activity as tolerated       Significant Diagnostic Studies:     Results for orders placed or  performed during the hospital encounter of 10/30/22   Blood culture #1 **CANNOT BE ORDERED STAT**    Specimen: Peripheral, Hand, Right; Blood   Result Value Ref Range    Blood Culture, Routine No Growth to date     Blood Culture, Routine No Growth to date     Blood Culture, Routine No Growth to date     Blood Culture, Routine No Growth to date    Blood culture #2 **CANNOT BE ORDERED STAT**    Specimen: Peripheral, Antecubital, Right; Blood   Result Value Ref Range    Blood Culture, Routine No Growth to date     Blood Culture, Routine No Growth to date     Blood Culture, Routine No Growth to date     Blood Culture, Routine No Growth to date    Influenza A & B by Molecular    Specimen: Nasopharyngeal Swab   Result Value Ref Range    Influenza A, Molecular Negative Negative    Influenza B, Molecular Negative Negative    Flu A & B Source Nasal swab    Culture, Respiratory with Gram Stain    Specimen: Sputum, Expectorated; Respiratory   Result Value Ref Range    Respiratory Culture Specimen inadequate - culture not performed     Gram Stain (Respiratory) >10epis/lfp and <than many WBC's     Gram Stain (Respiratory)       Predominance of oropharyngeal mounika. Please recollect.   Culture, Respiratory with Gram Stain    Specimen: Bronchial Wash, RLL   Result Value Ref Range    Respiratory Culture No Growth     Gram Stain (Respiratory) No WBC's     Gram Stain (Respiratory) No organisms seen     Gram Stain (Respiratory) <10 epithelial cells per low power field.    CBC auto differential   Result Value Ref Range    WBC 9.53 3.90 - 12.70 K/uL    RBC 4.53 (L) 4.60 - 6.20 M/uL    Hemoglobin 13.9 (L) 14.0 - 18.0 g/dL    Hematocrit 46.7 40.0 - 54.0 %     (H) 82 - 98 fL    MCH 30.7 27.0 - 31.0 pg    MCHC 29.8 (L) 32.0 - 36.0 g/dL    RDW 15.6 (H) 11.5 - 14.5 %    Platelets 206 150 - 450 K/uL    MPV 9.3 9.2 - 12.9 fL    Immature Granulocytes 0.8 (H) 0.0 - 0.5 %    Gran # (ANC) 7.8 (H) 1.8 - 7.7 K/uL    Immature Grans (Abs) 0.08  (H) 0.00 - 0.04 K/uL    Lymph # 0.7 (L) 1.0 - 4.8 K/uL    Mono # 0.8 0.3 - 1.0 K/uL    Eos # 0.1 0.0 - 0.5 K/uL    Baso # 0.03 0.00 - 0.20 K/uL    nRBC 0 0 /100 WBC    Gran % 81.5 (H) 38.0 - 73.0 %    Lymph % 7.6 (L) 18.0 - 48.0 %    Mono % 8.6 4.0 - 15.0 %    Eosinophil % 1.2 0.0 - 8.0 %    Basophil % 0.3 0.0 - 1.9 %    Differential Method Automated    Comprehensive metabolic panel   Result Value Ref Range    Sodium 144 136 - 145 mmol/L    Potassium 4.7 3.5 - 5.1 mmol/L    Chloride 99 95 - 110 mmol/L    CO2 35 (H) 23 - 29 mmol/L    Glucose 147 (H) 70 - 110 mg/dL    BUN 16 8 - 23 mg/dL    Creatinine 0.7 0.5 - 1.4 mg/dL    Calcium 9.0 8.7 - 10.5 mg/dL    Total Protein 7.6 6.0 - 8.4 g/dL    Albumin 3.1 (L) 3.5 - 5.2 g/dL    Total Bilirubin 0.3 0.1 - 1.0 mg/dL    Alkaline Phosphatase 74 55 - 135 U/L    AST 16 10 - 40 U/L    ALT 14 10 - 44 U/L    Anion Gap 10 8 - 16 mmol/L    eGFR >60 >60 mL/min/1.73 m^2   Brain natriuretic peptide   Result Value Ref Range    BNP 34 0 - 99 pg/mL   Troponin I   Result Value Ref Range    Troponin I <0.006 0.000 - 0.026 ng/mL   Lactic acid, plasma   Result Value Ref Range    Lactate (Lactic Acid) 0.7 0.5 - 2.2 mmol/L   Procalcitonin   Result Value Ref Range    Procalcitonin 0.02 <0.25 ng/mL   Urinalysis, Reflex to Urine Culture Urine, Catheterized    Specimen: Urine   Result Value Ref Range    Specimen UA Urine, Catheterized     Color, UA Yellow Yellow, Straw, Neelam    Appearance, UA Clear Clear    pH, UA 6.0 5.0 - 8.0    Specific Gravity, UA 1.020 1.005 - 1.030    Protein, UA 1+ (A) Negative    Glucose, UA Negative Negative    Ketones, UA Negative Negative    Bilirubin (UA) Negative Negative    Occult Blood UA Negative Negative    Nitrite, UA Negative Negative    Urobilinogen, UA 2.0-3.0 (A) <2.0 EU/dL    Leukocytes, UA Negative Negative   COVID-19 Rapid Screening   Result Value Ref Range    SARS-CoV-2 RNA, Amplification, Qual Positive (A) Negative   Urinalysis Microscopic   Result Value  Ref Range    RBC, UA 0 0 - 4 /hpf    WBC, UA 1 0 - 5 /hpf    Bacteria None None-Occ /hpf    Hyaline Casts, UA 3 (A) 0-1/lpf /lpf    Microscopic Comment MUCUS PRESENT    Ferritin   Result Value Ref Range    Ferritin 52 20.0 - 300.0 ng/mL   C-reactive protein   Result Value Ref Range    CRP 21.1 (H) 0.0 - 8.2 mg/L   Sedimentation rate   Result Value Ref Range    Sed Rate 27 (H) 0 - 10 mm/Hr   Procalcitonin   Result Value Ref Range    Procalcitonin 0.03 <0.25 ng/mL   TSH   Result Value Ref Range    TSH 0.233 (L) 0.400 - 4.000 uIU/mL   Cortisol   Result Value Ref Range    Cortisol 10.80 ug/dL   RESPIRATORY PATHOGENS PANEL (TEM-PCR) Tracheal Aspirate   Result Value Ref Range    Respiratory Virus Panel, source TRACH     TEM - Acinetobacter baumannii Not Detected Not Detected    TEM - Bordetella pertussis Not Detected Not Detected    TEM - Chlamydophila pneumoniae Not Detected Not Detected    TEM- Haemophilus influenzae Not Detected Not Detected    TEM- Haemophilus influenzae B Not Detected Not Detected    TEM - Klebsiella pneumoniae Not Detected Not Detected    TEM - Legionella pneumophila Not Detected Not Detected    TEM - Staphylococcus aureus Not Detected Not Detected    TEM - MRSA Positive (A) Not Detected    TEM - Dhaval -Rivers Not Detected Not Detected    TEM - Mycoplasma pneumoniae Not Detected Not Detected    TEM - Neisseria meningiditis Not Detected Not Detected    TEM - Pseudomonas aeruginosa Not Detected Not Detected    TEM - Streptococcus pneumoniae Not Detected Not Detected    TEM - Streptococcus pyogenes A Not Detected Not Detected    TEM - Moraxella catarrhalis Not Detected Not Detected    RVP - Adenovirus Not Detected Not Detected    Enterovirus/Rhinovirus Not Detected Not Detected    Human Bocavirus Not Detected Not Detected    Human Coronavirus, Common Cold Virus Not Detected Not Detected    RVP - Human Metapneumovirus (hMPV) Not Detected Not Detected    RVP - Influenza A Not Detected Not Detected     Influenza A - [id number] Not Detected Not Detected    RVP - Influenza B Not Detected Not Detected    Parainfluenza Not Detected Not Detected    Respiratory Syncytial VirusVirus (RSV) A Not Detected Not Detected   Ammonia   Result Value Ref Range    Ammonia SEE COMMENT umol/L   T4, Free   Result Value Ref Range    Free T4 0.89 0.71 - 1.51 ng/dL   CBC Auto Differential   Result Value Ref Range    WBC 14.14 (H) 3.90 - 12.70 K/uL    RBC 4.12 (L) 4.60 - 6.20 M/uL    Hemoglobin 12.7 (L) 14.0 - 18.0 g/dL    Hematocrit 39.4 (L) 40.0 - 54.0 %    MCV 96 82 - 98 fL    MCH 30.8 27.0 - 31.0 pg    MCHC 32.2 32.0 - 36.0 g/dL    RDW 16.0 (H) 11.5 - 14.5 %    Platelets 241 150 - 450 K/uL    MPV 9.8 9.2 - 12.9 fL    Immature Granulocytes 0.5 0.0 - 0.5 %    Gran # (ANC) 10.8 (H) 1.8 - 7.7 K/uL    Immature Grans (Abs) 0.07 (H) 0.00 - 0.04 K/uL    Lymph # 2.1 1.0 - 4.8 K/uL    Mono # 1.2 (H) 0.3 - 1.0 K/uL    Eos # 0.0 0.0 - 0.5 K/uL    Baso # 0.02 0.00 - 0.20 K/uL    nRBC 0 0 /100 WBC    Gran % 76.4 (H) 38.0 - 73.0 %    Lymph % 14.6 (L) 18.0 - 48.0 %    Mono % 8.3 4.0 - 15.0 %    Eosinophil % 0.1 0.0 - 8.0 %    Basophil % 0.1 0.0 - 1.9 %    Differential Method Automated    Basic Metabolic Panel   Result Value Ref Range    Sodium 139 136 - 145 mmol/L    Potassium 4.0 3.5 - 5.1 mmol/L    Chloride 103 95 - 110 mmol/L    CO2 29 23 - 29 mmol/L    Glucose 140 (H) 70 - 110 mg/dL    BUN 20 8 - 23 mg/dL    Creatinine 0.7 0.5 - 1.4 mg/dL    Calcium 8.1 (L) 8.7 - 10.5 mg/dL    Anion Gap 7 (L) 8 - 16 mmol/L    eGFR >60 >60 mL/min/1.73 m^2   Magnesium   Result Value Ref Range    Magnesium 1.7 1.6 - 2.6 mg/dL   Procalcitonin   Result Value Ref Range    Procalcitonin 0.05 <0.25 ng/mL   WBC & Diff,Body Fluid Bronchial Wash   Result Value Ref Range    Body Fluid Type Bronchial Wash     Fluid Appearance Hazy     Fluid Color Xanthochromic     WBC, Body Fluid 5445 /cu mm    Segs, Fluid 38 %    Monocytes/Macrophages, Fluid 62 %   CBC Auto Differential    Result Value Ref Range    WBC 11.66 3.90 - 12.70 K/uL    RBC 3.98 (L) 4.60 - 6.20 M/uL    Hemoglobin 11.9 (L) 14.0 - 18.0 g/dL    Hematocrit 38.0 (L) 40.0 - 54.0 %    MCV 96 82 - 98 fL    MCH 29.9 27.0 - 31.0 pg    MCHC 31.3 (L) 32.0 - 36.0 g/dL    RDW 16.5 (H) 11.5 - 14.5 %    Platelets 237 150 - 450 K/uL    MPV 9.8 9.2 - 12.9 fL    Immature Granulocytes 0.4 0.0 - 0.5 %    Gran # (ANC) 9.1 (H) 1.8 - 7.7 K/uL    Immature Grans (Abs) 0.05 (H) 0.00 - 0.04 K/uL    Lymph # 1.6 1.0 - 4.8 K/uL    Mono # 0.9 0.3 - 1.0 K/uL    Eos # 0.0 0.0 - 0.5 K/uL    Baso # 0.01 0.00 - 0.20 K/uL    nRBC 0 0 /100 WBC    Gran % 78.3 (H) 38.0 - 73.0 %    Lymph % 13.5 (L) 18.0 - 48.0 %    Mono % 7.6 4.0 - 15.0 %    Eosinophil % 0.1 0.0 - 8.0 %    Basophil % 0.1 0.0 - 1.9 %    Differential Method Automated    Basic Metabolic Panel   Result Value Ref Range    Sodium 143 136 - 145 mmol/L    Potassium 3.7 3.5 - 5.1 mmol/L    Chloride 107 95 - 110 mmol/L    CO2 27 23 - 29 mmol/L    Glucose 107 70 - 110 mg/dL    BUN 14 8 - 23 mg/dL    Creatinine 0.6 0.5 - 1.4 mg/dL    Calcium 8.1 (L) 8.7 - 10.5 mg/dL    Anion Gap 9 8 - 16 mmol/L    eGFR >60 >60 mL/min/1.73 m^2   Magnesium   Result Value Ref Range    Magnesium 2.1 1.6 - 2.6 mg/dL   CBC Auto Differential   Result Value Ref Range    WBC 10.56 3.90 - 12.70 K/uL    RBC 4.24 (L) 4.60 - 6.20 M/uL    Hemoglobin 12.7 (L) 14.0 - 18.0 g/dL    Hematocrit 40.9 40.0 - 54.0 %    MCV 97 82 - 98 fL    MCH 30.0 27.0 - 31.0 pg    MCHC 31.1 (L) 32.0 - 36.0 g/dL    RDW 16.7 (H) 11.5 - 14.5 %    Platelets 245 150 - 450 K/uL    MPV 9.6 9.2 - 12.9 fL    Immature Granulocytes 0.7 (H) 0.0 - 0.5 %    Gran # (ANC) 7.5 1.8 - 7.7 K/uL    Immature Grans (Abs) 0.07 (H) 0.00 - 0.04 K/uL    Lymph # 2.1 1.0 - 4.8 K/uL    Mono # 0.9 0.3 - 1.0 K/uL    Eos # 0.0 0.0 - 0.5 K/uL    Baso # 0.01 0.00 - 0.20 K/uL    nRBC 0 0 /100 WBC    Gran % 71.1 38.0 - 73.0 %    Lymph % 20.0 18.0 - 48.0 %    Mono % 8.0 4.0 - 15.0 %    Eosinophil %  0.1 0.0 - 8.0 %    Basophil % 0.1 0.0 - 1.9 %    Differential Method Automated    Basic Metabolic Panel   Result Value Ref Range    Sodium 141 136 - 145 mmol/L    Potassium 3.8 3.5 - 5.1 mmol/L    Chloride 104 95 - 110 mmol/L    CO2 28 23 - 29 mmol/L    Glucose 76 70 - 110 mg/dL    BUN 12 8 - 23 mg/dL    Creatinine 0.6 0.5 - 1.4 mg/dL    Calcium 8.4 (L) 8.7 - 10.5 mg/dL    Anion Gap 9 8 - 16 mmol/L    eGFR >60 >60 mL/min/1.73 m^2   Magnesium   Result Value Ref Range    Magnesium 2.0 1.6 - 2.6 mg/dL   Triglycerides   Result Value Ref Range    Triglycerides 63 30 - 150 mg/dL   CBC Auto Differential   Result Value Ref Range    WBC 10.51 3.90 - 12.70 K/uL    RBC 4.56 (L) 4.60 - 6.20 M/uL    Hemoglobin 13.7 (L) 14.0 - 18.0 g/dL    Hematocrit 42.8 40.0 - 54.0 %    MCV 94 82 - 98 fL    MCH 30.0 27.0 - 31.0 pg    MCHC 32.0 32.0 - 36.0 g/dL    RDW 15.9 (H) 11.5 - 14.5 %    Platelets 262 150 - 450 K/uL    MPV 9.3 9.2 - 12.9 fL    Immature Granulocytes 1.0 (H) 0.0 - 0.5 %    Gran # (ANC) 7.3 1.8 - 7.7 K/uL    Immature Grans (Abs) 0.11 (H) 0.00 - 0.04 K/uL    Lymph # 2.3 1.0 - 4.8 K/uL    Mono # 0.8 0.3 - 1.0 K/uL    Eos # 0.0 0.0 - 0.5 K/uL    Baso # 0.01 0.00 - 0.20 K/uL    nRBC 0 0 /100 WBC    Gran % 69.3 38.0 - 73.0 %    Lymph % 21.5 18.0 - 48.0 %    Mono % 8.0 4.0 - 15.0 %    Eosinophil % 0.1 0.0 - 8.0 %    Basophil % 0.1 0.0 - 1.9 %    Differential Method Automated    Basic Metabolic Panel   Result Value Ref Range    Sodium 139 136 - 145 mmol/L    Potassium 3.7 3.5 - 5.1 mmol/L    Chloride 102 95 - 110 mmol/L    CO2 27 23 - 29 mmol/L    Glucose 78 70 - 110 mg/dL    BUN 16 8 - 23 mg/dL    Creatinine 0.6 0.5 - 1.4 mg/dL    Calcium 8.6 (L) 8.7 - 10.5 mg/dL    Anion Gap 10 8 - 16 mmol/L    eGFR >60 >60 mL/min/1.73 m^2   Magnesium   Result Value Ref Range    Magnesium 2.0 1.6 - 2.6 mg/dL   Echo   Result Value Ref Range    TDI SEPTAL 0.06 m/s    LV LATERAL E/E' RATIO 5.88 m/s    LV SEPTAL E/E' RATIO 7.83 m/s    LA WIDTH 3.50 cm     Left Ventricular Outflow Tract Mean Velocity 0.61 cm/s    Left Ventricular Outflow Tract Mean Gradient 1.59 mmHg    TDI LATERAL 0.08 m/s    LVIDd 3.77 3.5 - 6.0 cm    IVS 2.10 (A) 0.6 - 1.1 cm    Posterior Wall 1.82 (A) 0.6 - 1.1 cm    Ao root annulus 4.61 cm    LVIDs 2.68 2.1 - 4.0 cm    FS 29 28 - 44 %    LA volume 44.72 cm3    Sinus 4.75 cm    STJ 4.20 cm    Ascending aorta 4.04 cm    LV mass 334.38 g    LA size 3.59 cm    RVDD 4.45 cm    Left Ventricle Relative Wall Thickness 0.97 cm    AV mean gradient 4 mmHg    AV valve area 2.73 cm2    AV Velocity Ratio 0.60     AV index (prosthetic) 0.60     MV valve area p 1/2 method 2.78 cm2    E/A ratio 1.15     Mean e' 0.07 m/s    E wave deceleration time 273.04 msec    IVRT 91.34 msec    LVOT diameter 2.40 cm    LVOT area 4.5 cm2    LVOT peak fernando 0.71 m/s    LVOT peak VTI 13.70 cm    Ao peak fernando 1.18 m/s    Ao VTI 22.7 cm    RVOT peak fernando 0.73 m/s    RVOT peak VTI 11.3 cm    LVOT stroke volume 61.95 cm3    AV peak gradient 6 mmHg    PV mean gradient 0.99 mmHg    E/E' ratio 6.71 m/s    MV Peak E Fernando 0.47 m/s    TR Max Fernando 2.14 m/s    MV stenosis pressure 1/2 time 79.18 ms    MV Peak A Fernando 0.41 m/s    LV Systolic Volume 26.50 mL    LV Diastolic Volume 60.60 mL    RA Major Axis 4.43 cm    Left Atrium Minor Axis 4.08 cm    Left Atrium Major Axis 4.30 cm    Triscuspid Valve Regurgitation Peak Gradient 18 mmHg    RA Width 2.90 cm    EF 50 %   ISTAT PROCEDURE   Result Value Ref Range    POC PH 7.257 (LL) 7.35 - 7.45    POC PCO2 98.5 (HH) 35 - 45 mmHg    POC PO2 71 (L) 80 - 100 mmHg    POC HCO3 43.9 (H) 24 - 28 mmol/L    POC BE 17 -2 to 2 mmol/L    POC SATURATED O2 89 (L) 95 - 100 %    Rate 20     Sample ARTERIAL     Site RR     Allens Test Pass     DelSys Adult Vent     Mode AC/PRVC     Vt 450     PEEP 8     FiO2 100    ISTAT PROCEDURE   Result Value Ref Range    POC PH 7.408 7.35 - 7.45    POC PCO2 56.1 (HH) 35 - 45 mmHg    POC PO2 67 (L) 80 - 100 mmHg    POC HCO3 35.4 (H)  24 - 28 mmol/L    POC BE 11 -2 to 2 mmol/L    POC SATURATED O2 93 (L) 95 - 100 %    Rate 30     Sample ARTERIAL     Site RR     Allens Test Pass     DelSys Adult Vent     Mode AC/PRVC     Vt 470     PEEP 10     FiO2 100    POCT glucose   Result Value Ref Range    POCT Glucose 102 70 - 110 mg/dL   ISTAT PROCEDURE   Result Value Ref Range    POC PH 7.475 (H) 7.35 - 7.45    POC PCO2 44.3 35 - 45 mmHg    POC PO2 54 (LL) 80 - 100 mmHg    POC HCO3 32.6 (H) 24 - 28 mmol/L    POC BE 9 -2 to 2 mmol/L    POC SATURATED O2 90 (L) 95 - 100 %    Rate 30     Sample ARTERIAL     Site LR     Allens Test Pass     DelSys Adult Vent     Mode AC/PRVC     Vt 470     PEEP 10     FiO2 100    POCT glucose   Result Value Ref Range    POCT Glucose 133 (H) 70 - 110 mg/dL   ISTAT PROCEDURE   Result Value Ref Range    POC PH 7.451 (H) 7.35 - 7.45    POC PCO2 45.2 (H) 35 - 45 mmHg    POC PO2 99 80 - 100 mmHg    POC HCO3 31.5 (H) 24 - 28 mmol/L    POC BE 8 -2 to 2 mmol/L    POC SATURATED O2 98 95 - 100 %    Rate 30     Sample ARTERIAL     Site Shereen/UAC     Allens Test N/A     DelSys Adult Vent     Mode AC/PRVC     Vt 470     PEEP 10     FiO2 90    POCT glucose   Result Value Ref Range    POCT Glucose 116 (H) 70 - 110 mg/dL   POCT glucose   Result Value Ref Range    POCT Glucose 110 70 - 110 mg/dL   POCT glucose   Result Value Ref Range    POCT Glucose 141 (H) 70 - 110 mg/dL   POCT glucose   Result Value Ref Range    POCT Glucose 128 (H) 70 - 110 mg/dL   ISTAT PROCEDURE   Result Value Ref Range    POC PH 7.499 (H) 7.35 - 7.45    POC PCO2 39.4 35 - 45 mmHg    POC PO2 88 80 - 100 mmHg    POC HCO3 30.6 (H) 24 - 28 mmol/L    POC BE 7 -2 to 2 mmol/L    POC SATURATED O2 98 95 - 100 %    Rate 30     Sample ARTERIAL     Site RR     Allens Test Pass     DelSys Adult Vent     Mode AC/PRVC     Vt 470     PEEP 10     FiO2 65    POCT glucose   Result Value Ref Range    POCT Glucose 106 70 - 110 mg/dL   ISTAT PROCEDURE   Result Value Ref Range    POC PH 7.414  7.35 - 7.45    POC PCO2 47.2 (H) 35 - 45 mmHg    POC PO2 82 80 - 100 mmHg    POC HCO3 30.2 (H) 24 - 28 mmol/L    POC BE 6 -2 to 2 mmol/L    POC SATURATED O2 96 95 - 100 %    Rate 24     Sample ARTERIAL     Site RR     Allens Test Pass     DelSys Adult Vent     Mode AC/PRVC     Vt 470     PEEP 8     FiO2 60    POCT glucose   Result Value Ref Range    POCT Glucose 110 70 - 110 mg/dL   POCT glucose   Result Value Ref Range    POCT Glucose 106 70 - 110 mg/dL   POCT glucose   Result Value Ref Range    POCT Glucose 83 70 - 110 mg/dL   POCT glucose   Result Value Ref Range    POCT Glucose 72 70 - 110 mg/dL   POCT glucose   Result Value Ref Range    POCT Glucose 101 70 - 110 mg/dL   POCT glucose   Result Value Ref Range    POCT Glucose 105 70 - 110 mg/dL   POCT glucose   Result Value Ref Range    POCT Glucose 103 70 - 110 mg/dL   POCT glucose   Result Value Ref Range    POCT Glucose 82 70 - 110 mg/dL     Pending Diagnostic Studies:     Procedure Component Value Units Date/Time    Cytology, Pulmonary [[id number]] Collected: 10/31/22 1215    Order Status: Sent Lab Status: In process Updated: 10/31/22 1236           Imaging Results          CTA Chest Non-Coronary (PE Studies) (Final result)  Result time 10/30/22 13:44:24    Final result by Raheem Hendrickson MD (10/30/22 13:44:24)                 Impression:      1. No pulmonary embolism.  2. Mucous plug in the right mainstem bronchus with total atelectasis of the right middle lobe and right lower lobe.  3. Marked dependent atelectasis in the basilar segments left lower lobe and dependent atelectasis in the right upper lobe.  4. Extensive calcific atherosclerotic disease of the coronary arteries.  All CT scans at this facility are performed  using dose modulation techniques as appropriate to performed exam including the following:  automated exposure control; adjustment of mA and/or kV according to the patients size (this includes techniques or standardized protocols for targeted  exams where dose is matched to indication/reason for exam: i.e. extremities or head);  iterative reconstruction technique.      Electronically signed by: Raheem Hendrickson MD  Date:    10/30/2022  Time:    13:44             Narrative:    EXAMINATION:  CTA CHEST NON CORONARY (PE STUDIES)    CLINICAL HISTORY:  Pulmonary embolism (PE) suspected, high prob;    TECHNIQUE:  Axial CTA images performed through the chest after the administration of 100 cc intravenous contrast. 3D MIP images were performed and interpreted.    COMPARISON:  None    FINDINGS:  No filling defects in the pulmonary arteries to indicate a pulmonary embolism.  Calcific atherosclerotic disease of the coronary arteries.  No pericardial effusion.  NG tube tip in the fundus of the stomach.  Right IJ central venous catheter tip in the SVC.    There is a mucous plug in the right mainstem bronchus with total atelectasis of the right middle lobe and right lower lobe.  There is dependent atelectasis in the right upper lobe.  There is marked atelectasis in the basilar segments of the left lower lobe.  No pleural effusion or pneumothorax.    No acute osseous abnormality.                               X-Ray Chest 1 View (Final result)  Result time 10/30/22 11:06:21    Final result by Raheem Hendrickson MD (10/30/22 11:06:21)                 Impression:      See above      Electronically signed by: Raheem Hendrickson MD  Date:    10/30/2022  Time:    11:06             Narrative:    EXAMINATION:  XR CHEST 1 VIEW    CLINICAL HISTORY:  Central line plcmnt;    COMPARISON:  Chest x-ray performed less than 2 hours ago    FINDINGS:  Endotracheal tube in good position.  NG tube tip in the fundus of the stomach.  Newly placed right IJ central venous catheter tip in the SVC.  No pneumothorax.  Right middle lobe and right lower lobe total atelectasis.  Marked left basilar atelectasis.                               CT Head Without Contrast (Final result)  Result time 10/30/22 09:52:01     Final result by Raheem Hendrickson MD (10/30/22 09:52:01)                 Impression:      1. No acute intracranial process.  2. 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  3. Fluid filling the right middle ear cavity which can be seen with otitis media.  4. Paranasal sinus disease.  All CT scans at this facility are performed  using dose modulation techniques as appropriate to performed exam including the following:  automated exposure control; adjustment of mA and/or kV according to the patients size (this includes techniques or standardized protocols for targeted exams where dose is matched to indication/reason for exam: i.e. extremities or head);  iterative reconstruction technique.      Electronically signed by: Raheem Hendrickson MD  Date:    10/30/2022  Time:    09:52             Narrative:    EXAMINATION:  CT HEAD WITHOUT CONTRAST    CLINICAL HISTORY:  Mental status change, unknown cause;    TECHNIQUE:  Axial CT imaging was performed through the head without intravenous contrast.    COMPARISON:  None    FINDINGS:  No hydrocephalus, midline shift, mass effect, or acute intracranial hemorrhage. Chronic small vessel ischemic change of the white matter.  Old lacunar infarcts in the left caudate nucleus head and body.  Mucosal thickening of the paranasal sinuses.  Fluid filling the right middle ear cavity..  The skull is intact.                               X-Ray Chest AP Portable (Final result)  Result time 10/30/22 09:26:02    Final result by Raheem Hendrickson MD (10/30/22 09:26:02)                 Impression:      See above      Electronically signed by: Raheem Hendrickson MD  Date:    10/30/2022  Time:    09:26             Narrative:    EXAMINATION:  XR CHEST AP PORTABLE    CLINICAL HISTORY:  Presence of other specified devices    COMPARISON:  None.    FINDINGS:  Endotracheal tube in good position.  The NG tube courses down the esophagus with its tip in the  fundus of the stomach.  There are markedly elevated hemidiaphragms with marked low lung volumes.  Right middle lobe and right lower lobe total atelectasis with a cut off sign of the right mainstem bronchus.  Marked left basilar atelectasis.  No pneumothorax.                              Medications:         Medication List      START taking these medications    amoxicillin-clavulanate 875-125mg 875-125 mg per tablet  Commonly known as: AUGMENTIN  Take 1 tablet by mouth every 12 (twelve) hours. for 4 days     predniSONE 20 MG tablet  Commonly known as: DELTASONE  Take 2 tablets (40 mg total) by mouth once daily. for 3 days        CONTINUE taking these medications    amLODIPine 5 MG tablet  Commonly known as: NORVASC     apixaban 5 mg Tab  Commonly known as: ELIQUIS     aspirin 81 MG EC tablet  Commonly known as: ECOTRIN     atorvastatin 10 MG tablet  Commonly known as: LIPITOR     buPROPion 300 MG 24 hr tablet  Commonly known as: WELLBUTRIN XL     folic acid 1 MG tablet  Commonly known as: FOLVITE     gabapentin 400 MG capsule  Commonly known as: NEURONTIN     losartan 50 MG tablet  Commonly known as: COZAAR     melatonin 10 mg Tab     metoprolol succinate 50 MG 24 hr tablet  Commonly known as: TOPROL-XL     ondansetron 4 MG tablet  Commonly known as: ZOFRAN     temazepam 15 mg Cap  Commonly known as: RESTORIL        STOP taking these medications    senna-docusate 8.6-50 mg 8.6-50 mg per tablet  Commonly known as: PERICOLACE           Where to Get Your Medications      Information about where to get these medications is not yet available    Ask your nurse or doctor about these medications  · amoxicillin-clavulanate 875-125mg 875-125 mg per tablet  · predniSONE 20 MG tablet         Indwelling Lines/Drains at time of discharge:   Lines/Drains/Airways     Central Venous Catheter Line  Duration           Percutaneous Central Line Insertion/Assessment - Triple Lumen  10/30/22 1051 right internal jugular 4 days           Drain  Duration                Urethral Catheter 10/30/22 1000 16 Fr. 4 days                Time spent on the discharge of patient: 58 minutes         Guerline Arevalo MD  Department of Hospital Medicine  '[place] - Telemetry ([facility])

## 2022-11-03 NOTE — PLAN OF CARE
Patient discharging back to [facility].  Please call report to nurse on [address] @ [phone].  [facility] has been called by [facility] ([facility]    Discharge paperwork sent via [employer].       11/03/22 1219   Post-Acute Status   Post-Acute Authorization Placement   Post-Acute Placement Status Set-up Complete/Auth obtained   Discharge Plan   Discharge Plan A Skilled Nursing Facility

## 2022-11-04 LAB
BACTERIA BLD CULT: NORMAL
BACTERIA BLD CULT: NORMAL
BACTERIA SPEC AEROBE CULT: NO GROWTH
FINAL PATHOLOGIC DIAGNOSIS: NORMAL
GRAM STN SPEC: NORMAL
Lab: NORMAL

## 2022-11-04 NOTE — PROGRESS NOTES
Late Documentation for vecucronium injection 10mg at 1159 on 10/31 was given per MD Licona at bedside.

## 2022-12-07 LAB — FUNGUS SPEC CULT: ABNORMAL

## 2023-01-30 PROBLEM — J96.01 ACUTE HYPOXEMIC RESPIRATORY FAILURE: Status: RESOLVED | Noted: 2022-10-31 | Resolved: 2023-01-30

## 2023-01-30 PROBLEM — J18.9 RIGHT MIDDLE LOBE PNEUMONIA: Status: RESOLVED | Noted: 2022-10-30 | Resolved: 2023-01-30

## 2023-01-30 PROBLEM — J96.01 ACUTE RESPIRATORY FAILURE WITH HYPOXIA: Status: RESOLVED | Noted: 2022-05-05 | Resolved: 2023-01-30

## 2023-04-25 ENCOUNTER — LAB VISIT (OUTPATIENT)
Dept: LAB | Facility: HOSPITAL | Age: 73
End: 2023-04-25
Attending: INTERNAL MEDICINE
Payer: MEDICARE

## 2023-04-25 DIAGNOSIS — E78.5 HYPERLIPEMIA: Primary | ICD-10-CM

## 2023-04-25 DIAGNOSIS — I10 ESSENTIAL HYPERTENSION, MALIGNANT: ICD-10-CM

## 2023-04-25 DIAGNOSIS — R73.09 IMPAIRED GLUCOSE TOLERANCE TEST: ICD-10-CM

## 2023-04-25 LAB
ALBUMIN SERPL BCP-MCNC: 3 G/DL (ref 3.5–5.2)
ALP SERPL-CCNC: 69 U/L (ref 55–135)
ALT SERPL W/O P-5'-P-CCNC: 22 U/L (ref 10–44)
ANION GAP SERPL CALC-SCNC: 16 MMOL/L (ref 8–16)
AST SERPL-CCNC: 21 U/L (ref 10–40)
BASOPHILS # BLD AUTO: 0.04 K/UL (ref 0–0.2)
BASOPHILS NFR BLD: 0.6 % (ref 0–1.9)
BILIRUB SERPL-MCNC: 0.4 MG/DL (ref 0.1–1)
BUN SERPL-MCNC: 7 MG/DL (ref 8–23)
CALCIUM SERPL-MCNC: 8.2 MG/DL (ref 8.7–10.5)
CHLORIDE SERPL-SCNC: 101 MMOL/L (ref 95–110)
CHOLEST SERPL-MCNC: 145 MG/DL (ref 120–199)
CHOLEST/HDLC SERPL: 4.4 {RATIO} (ref 2–5)
CO2 SERPL-SCNC: 26 MMOL/L (ref 23–29)
CREAT SERPL-MCNC: 0.7 MG/DL (ref 0.5–1.4)
DIFFERENTIAL METHOD: ABNORMAL
EOSINOPHIL # BLD AUTO: 0.3 K/UL (ref 0–0.5)
EOSINOPHIL NFR BLD: 4 % (ref 0–8)
ERYTHROCYTE [DISTWIDTH] IN BLOOD BY AUTOMATED COUNT: 15.2 % (ref 11.5–14.5)
EST. GFR  (NO RACE VARIABLE): >60 ML/MIN/1.73 M^2
ESTIMATED AVG GLUCOSE: 103 MG/DL (ref 68–131)
GLUCOSE SERPL-MCNC: 82 MG/DL (ref 70–110)
HBA1C MFR BLD: 5.2 % (ref 4–5.6)
HCT VFR BLD AUTO: 50.8 % (ref 40–54)
HDLC SERPL-MCNC: 33 MG/DL (ref 40–75)
HDLC SERPL: 22.8 % (ref 20–50)
HGB BLD-MCNC: 15.7 G/DL (ref 14–18)
IMM GRANULOCYTES # BLD AUTO: 0.02 K/UL (ref 0–0.04)
IMM GRANULOCYTES NFR BLD AUTO: 0.3 % (ref 0–0.5)
LDLC SERPL CALC-MCNC: 85.2 MG/DL (ref 63–159)
LYMPHOCYTES # BLD AUTO: 2 K/UL (ref 1–4.8)
LYMPHOCYTES NFR BLD: 31 % (ref 18–48)
MCH RBC QN AUTO: 30.9 PG (ref 27–31)
MCHC RBC AUTO-ENTMCNC: 30.9 G/DL (ref 32–36)
MCV RBC AUTO: 100 FL (ref 82–98)
MONOCYTES # BLD AUTO: 0.9 K/UL (ref 0.3–1)
MONOCYTES NFR BLD: 13.3 % (ref 4–15)
NEUTROPHILS # BLD AUTO: 3.3 K/UL (ref 1.8–7.7)
NEUTROPHILS NFR BLD: 50.8 % (ref 38–73)
NONHDLC SERPL-MCNC: 112 MG/DL
NRBC BLD-RTO: 0 /100 WBC
PLATELET # BLD AUTO: 213 K/UL (ref 150–450)
PMV BLD AUTO: 10.7 FL (ref 9.2–12.9)
POTASSIUM SERPL-SCNC: 3.6 MMOL/L (ref 3.5–5.1)
PROT SERPL-MCNC: 6.5 G/DL (ref 6–8.4)
RBC # BLD AUTO: 5.08 M/UL (ref 4.6–6.2)
SODIUM SERPL-SCNC: 143 MMOL/L (ref 136–145)
TRIGL SERPL-MCNC: 134 MG/DL (ref 30–150)
WBC # BLD AUTO: 6.54 K/UL (ref 3.9–12.7)

## 2023-04-25 PROCEDURE — 36415 COLL VENOUS BLD VENIPUNCTURE: CPT | Performed by: INTERNAL MEDICINE

## 2023-04-25 PROCEDURE — 80053 COMPREHEN METABOLIC PANEL: CPT | Performed by: INTERNAL MEDICINE

## 2023-04-25 PROCEDURE — 83036 HEMOGLOBIN GLYCOSYLATED A1C: CPT | Performed by: INTERNAL MEDICINE

## 2023-04-25 PROCEDURE — 85025 COMPLETE CBC W/AUTO DIFF WBC: CPT | Performed by: INTERNAL MEDICINE

## 2023-04-25 PROCEDURE — 80061 LIPID PANEL: CPT | Performed by: INTERNAL MEDICINE

## 2023-10-17 NOTE — PLAN OF CARE
Problem: Infection  Goal: Absence of Infection Signs and Symptoms  Outcome: Ongoing, Progressing     Problem: Communication Impairment (Mechanical Ventilation, Invasive)  Goal: Effective Communication  Outcome: Ongoing, Progressing     Problem: Device-Related Complication Risk (Mechanical Ventilation, Invasive)  Goal: Optimal Device Function  Outcome: Ongoing, Progressing     Problem: Inability to Wean (Mechanical Ventilation, Invasive)  Goal: Mechanical Ventilation Liberation  Outcome: Ongoing, Progressing     Problem: Nutrition Impairment (Mechanical Ventilation, Invasive)  Goal: Optimal Nutrition Delivery  Outcome: Ongoing, Progressing     Problem: Skin and Tissue Injury (Mechanical Ventilation, Invasive)  Goal: Absence of Device-Related Skin and Tissue Injury  Outcome: Ongoing, Progressing     Problem: Ventilator-Induced Lung Injury (Mechanical Ventilation, Invasive)  Goal: Absence of Ventilator-Induced Lung Injury  Outcome: Ongoing, Progressing     Problem: Communication Impairment (Artificial Airway)  Goal: Effective Communication  Outcome: Ongoing, Progressing     Problem: Device-Related Complication Risk (Artificial Airway)  Goal: Optimal Device Function  Outcome: Ongoing, Progressing     Problem: Skin and Tissue Injury (Artificial Airway)  Goal: Absence of Device-Related Skin or Tissue Injury  Outcome: Ongoing, Progressing     Problem: Noninvasive Ventilation Acute  Goal: Effective Unassisted Ventilation and Oxygenation  Outcome: Ongoing, Progressing     Problem: Adult Inpatient Plan of Care  Goal: Plan of Care Review  Outcome: Ongoing, Progressing  Goal: Patient-Specific Goal (Individualized)  Outcome: Ongoing, Progressing  Goal: Absence of Hospital-Acquired Illness or Injury  Outcome: Ongoing, Progressing  Goal: Optimal Comfort and Wellbeing  Outcome: Ongoing, Progressing  Goal: Readiness for Transition of Care  Outcome: Ongoing, Progressing     Problem: Fall Injury Risk  Goal: Absence of Fall and  Do you still want labs from June drawn?   Fall-Related Injury  Outcome: Ongoing, Progressing     Problem: Restraint, Nonbehavioral (Nonviolent)  Goal: Absence of Harm or Injury  Outcome: Ongoing, Progressing     Problem: Fluid Imbalance (Pneumonia)  Goal: Fluid Balance  Outcome: Ongoing, Progressing     Problem: Infection (Pneumonia)  Goal: Resolution of Infection Signs and Symptoms  Outcome: Ongoing, Progressing     Problem: Respiratory Compromise (Pneumonia)  Goal: Effective Oxygenation and Ventilation  Outcome: Ongoing, Progressing     Problem: Skin Injury Risk Increased  Goal: Skin Health and Integrity  Outcome: Ongoing, Progressing